# Patient Record
Sex: MALE | Race: WHITE | NOT HISPANIC OR LATINO | Employment: FULL TIME | URBAN - METROPOLITAN AREA
[De-identification: names, ages, dates, MRNs, and addresses within clinical notes are randomized per-mention and may not be internally consistent; named-entity substitution may affect disease eponyms.]

---

## 2017-02-01 ENCOUNTER — HISTORICAL (OUTPATIENT)
Dept: ADMINISTRATIVE | Facility: HOSPITAL | Age: 42
End: 2017-02-01

## 2017-05-15 ENCOUNTER — HISTORICAL (OUTPATIENT)
Dept: ADMINISTRATIVE | Facility: HOSPITAL | Age: 42
End: 2017-05-15

## 2017-05-17 ENCOUNTER — HISTORICAL (OUTPATIENT)
Dept: CARDIOLOGY | Facility: HOSPITAL | Age: 42
End: 2017-05-17

## 2017-05-24 ENCOUNTER — HISTORICAL (OUTPATIENT)
Dept: ADMINISTRATIVE | Facility: HOSPITAL | Age: 42
End: 2017-05-24

## 2017-06-20 ENCOUNTER — HISTORICAL (OUTPATIENT)
Dept: RADIOLOGY | Facility: HOSPITAL | Age: 42
End: 2017-06-20

## 2017-08-03 ENCOUNTER — HISTORICAL (OUTPATIENT)
Dept: SURGERY | Facility: HOSPITAL | Age: 42
End: 2017-08-03

## 2017-10-12 ENCOUNTER — HISTORICAL (OUTPATIENT)
Dept: ADMINISTRATIVE | Facility: HOSPITAL | Age: 42
End: 2017-10-12

## 2017-10-12 LAB
ABS NEUT (OLG): 2.85 X10(3)/MCL (ref 2.1–9.2)
ALBUMIN SERPL-MCNC: 3.8 GM/DL (ref 3.4–5)
ALBUMIN/GLOB SERPL: 1.4 {RATIO}
ALP SERPL-CCNC: 115 UNIT/L (ref 50–136)
ALT SERPL-CCNC: 26 UNIT/L (ref 12–78)
APPEARANCE, UA: CLEAR
AST SERPL-CCNC: 12 UNIT/L (ref 15–37)
BACTERIA SPEC CULT: NORMAL /HPF
BASOPHILS # BLD AUTO: 0 X10(3)/MCL (ref 0–0.2)
BASOPHILS NFR BLD AUTO: 1 %
BILIRUB SERPL-MCNC: 1.2 MG/DL (ref 0.2–1)
BILIRUB UR QL STRIP: NEGATIVE
BILIRUBIN DIRECT+TOT PNL SERPL-MCNC: 0.2 MG/DL (ref 0–0.2)
BILIRUBIN DIRECT+TOT PNL SERPL-MCNC: 1 MG/DL (ref 0–0.8)
BUN SERPL-MCNC: 14 MG/DL (ref 7–18)
CALCIUM SERPL-MCNC: 9.2 MG/DL (ref 8.5–10.1)
CHLORIDE SERPL-SCNC: 106 MMOL/L (ref 98–107)
CHOLEST SERPL-MCNC: 186 MG/DL (ref 0–200)
CHOLEST/HDLC SERPL: 4.8 {RATIO} (ref 0–5)
CO2 SERPL-SCNC: 25 MMOL/L (ref 21–32)
COLOR UR: YELLOW
CREAT SERPL-MCNC: 1 MG/DL (ref 0.7–1.3)
EOSINOPHIL # BLD AUTO: 0.2 X10(3)/MCL (ref 0–0.9)
EOSINOPHIL NFR BLD AUTO: 4 %
ERYTHROCYTE [DISTWIDTH] IN BLOOD BY AUTOMATED COUNT: 12.5 % (ref 11.5–17)
GLOBULIN SER-MCNC: 2.8 GM/DL (ref 2.4–3.5)
GLUCOSE (UA): NEGATIVE
GLUCOSE SERPL-MCNC: 91 MG/DL (ref 74–106)
HCT VFR BLD AUTO: 45.6 % (ref 42–52)
HDLC SERPL-MCNC: 39 MG/DL (ref 35–60)
HGB BLD-MCNC: 16.2 GM/DL (ref 14–18)
HGB UR QL STRIP: NEGATIVE
KETONES UR QL STRIP: NEGATIVE
LDLC SERPL CALC-MCNC: 104 MG/DL (ref 0–129)
LEUKOCYTE ESTERASE UR QL STRIP: NEGATIVE
LYMPHOCYTES # BLD AUTO: 1.4 X10(3)/MCL (ref 0.6–4.6)
LYMPHOCYTES NFR BLD AUTO: 28 %
MCH RBC QN AUTO: 32 PG (ref 27–31)
MCHC RBC AUTO-ENTMCNC: 35.5 GM/DL (ref 33–36)
MCV RBC AUTO: 89.9 FL (ref 80–94)
MONOCYTES # BLD AUTO: 0.6 X10(3)/MCL (ref 0.1–1.3)
MONOCYTES NFR BLD AUTO: 11 %
NEUTROPHILS # BLD AUTO: 2.85 X10(3)/MCL (ref 2.1–9.2)
NEUTROPHILS NFR BLD AUTO: 56 %
NITRITE UR QL STRIP: NEGATIVE
PH UR STRIP: 6 [PH] (ref 5–9)
PLATELET # BLD AUTO: 168 X10(3)/MCL (ref 130–400)
PMV BLD AUTO: 9 FL (ref 9.4–12.4)
POTASSIUM SERPL-SCNC: 4.2 MMOL/L (ref 3.5–5.1)
PROT SERPL-MCNC: 6.6 GM/DL (ref 6.4–8.2)
PROT UR QL STRIP: NEGATIVE
PSA SERPL-MCNC: 0.54 NG/ML (ref 0–4)
RBC # BLD AUTO: 5.07 X10(6)/MCL (ref 4.7–6.1)
RBC #/AREA URNS HPF: NORMAL /[HPF]
SODIUM SERPL-SCNC: 141 MMOL/L (ref 136–145)
SP GR UR STRIP: 1.02 (ref 1–1.03)
SQUAMOUS EPITHELIAL, UA: NORMAL
TRIGL SERPL-MCNC: 216 MG/DL (ref 30–150)
TSH SERPL-ACNC: 2.33 MIU/ML (ref 0.36–3.74)
UROBILINOGEN UR STRIP-ACNC: 0.2
VLDLC SERPL CALC-MCNC: 43 MG/DL
WBC # SPEC AUTO: 5 X10(3)/MCL (ref 4.5–11.5)
WBC #/AREA URNS HPF: NORMAL /HPF

## 2018-09-17 ENCOUNTER — HISTORICAL (OUTPATIENT)
Dept: ADMINISTRATIVE | Facility: HOSPITAL | Age: 43
End: 2018-09-17

## 2019-01-15 ENCOUNTER — HISTORICAL (OUTPATIENT)
Dept: ADMINISTRATIVE | Facility: HOSPITAL | Age: 44
End: 2019-01-15

## 2019-10-31 ENCOUNTER — HISTORICAL (OUTPATIENT)
Dept: ADMINISTRATIVE | Facility: HOSPITAL | Age: 44
End: 2019-10-31

## 2019-10-31 LAB
ABS NEUT (OLG): 2.81 X10(3)/MCL (ref 2.1–9.2)
ALBUMIN SERPL-MCNC: 3.7 GM/DL (ref 3.4–5)
ALBUMIN/GLOB SERPL: 1.5 RATIO (ref 1.1–2)
ALP SERPL-CCNC: 69 UNIT/L (ref 50–136)
ALT SERPL-CCNC: 40 UNIT/L (ref 12–78)
APPEARANCE, UA: CLEAR
AST SERPL-CCNC: 24 UNIT/L (ref 15–37)
BACTERIA SPEC CULT: NORMAL /HPF
BASOPHILS # BLD AUTO: 0 X10(3)/MCL (ref 0–0.2)
BASOPHILS NFR BLD AUTO: 1 %
BILIRUB SERPL-MCNC: 1.8 MG/DL (ref 0.2–1)
BILIRUB UR QL STRIP: NEGATIVE
BILIRUBIN DIRECT+TOT PNL SERPL-MCNC: 0.4 MG/DL (ref 0–0.5)
BILIRUBIN DIRECT+TOT PNL SERPL-MCNC: 1.4 MG/DL (ref 0–0.8)
BUN SERPL-MCNC: 12 MG/DL (ref 7–18)
CALCIUM SERPL-MCNC: 9.3 MG/DL (ref 8.5–10.1)
CHLORIDE SERPL-SCNC: 108 MMOL/L (ref 98–107)
CHOLEST SERPL-MCNC: 116 MG/DL (ref 0–200)
CHOLEST/HDLC SERPL: 2.4 {RATIO} (ref 0–5)
CO2 SERPL-SCNC: 33 MMOL/L (ref 21–32)
COLOR UR: YELLOW
CREAT SERPL-MCNC: 0.97 MG/DL (ref 0.7–1.3)
EOSINOPHIL # BLD AUTO: 0.2 X10(3)/MCL (ref 0–0.9)
EOSINOPHIL NFR BLD AUTO: 3 %
ERYTHROCYTE [DISTWIDTH] IN BLOOD BY AUTOMATED COUNT: 12.6 % (ref 11.5–17)
GLOBULIN SER-MCNC: 2.5 GM/DL (ref 2.4–3.5)
GLUCOSE (UA): NEGATIVE
GLUCOSE SERPL-MCNC: 95 MG/DL (ref 74–106)
HCT VFR BLD AUTO: 44.7 % (ref 42–52)
HDLC SERPL-MCNC: 49 MG/DL (ref 35–60)
HGB BLD-MCNC: 15.1 GM/DL (ref 14–18)
HGB UR QL STRIP: NEGATIVE
KETONES UR QL STRIP: NEGATIVE
LDLC SERPL CALC-MCNC: 52 MG/DL (ref 0–129)
LEUKOCYTE ESTERASE UR QL STRIP: NEGATIVE
LYMPHOCYTES # BLD AUTO: 1 X10(3)/MCL (ref 0.6–4.6)
LYMPHOCYTES NFR BLD AUTO: 23 %
MCH RBC QN AUTO: 31.4 PG (ref 27–31)
MCHC RBC AUTO-ENTMCNC: 33.8 GM/DL (ref 33–36)
MCV RBC AUTO: 92.9 FL (ref 80–94)
MONOCYTES # BLD AUTO: 0.4 X10(3)/MCL (ref 0.1–1.3)
MONOCYTES NFR BLD AUTO: 9 %
NEUTROPHILS # BLD AUTO: 2.81 X10(3)/MCL (ref 2.1–9.2)
NEUTROPHILS NFR BLD AUTO: 63 %
NITRITE UR QL STRIP: NEGATIVE
PH UR STRIP: 6.5 [PH] (ref 5–9)
PLATELET # BLD AUTO: 196 X10(3)/MCL (ref 130–400)
PMV BLD AUTO: 9.2 FL (ref 9.4–12.4)
POTASSIUM SERPL-SCNC: 4.1 MMOL/L (ref 3.5–5.1)
PROT SERPL-MCNC: 6.2 GM/DL (ref 6.4–8.2)
PROT UR QL STRIP: NEGATIVE
PSA SERPL-MCNC: 0.32 NG/ML (ref 0–4)
RBC # BLD AUTO: 4.81 X10(6)/MCL (ref 4.7–6.1)
RBC #/AREA URNS HPF: NORMAL /[HPF]
SODIUM SERPL-SCNC: 143 MMOL/L (ref 136–145)
SP GR UR STRIP: 1.01 (ref 1–1.03)
SQUAMOUS EPITHELIAL, UA: NORMAL
T4 FREE SERPL-MCNC: 0.91 NG/DL (ref 0.76–1.46)
TRIGL SERPL-MCNC: 75 MG/DL (ref 30–150)
TSH SERPL-ACNC: 0.8 MIU/L (ref 0.36–3.74)
UROBILINOGEN UR STRIP-ACNC: 0.2
VLDLC SERPL CALC-MCNC: 15 MG/DL
WBC # SPEC AUTO: 4.4 X10(3)/MCL (ref 4.5–11.5)
WBC #/AREA URNS HPF: NORMAL /HPF

## 2019-11-19 ENCOUNTER — HISTORICAL (OUTPATIENT)
Dept: LAB | Facility: HOSPITAL | Age: 44
End: 2019-11-19

## 2019-11-19 LAB
ALBUMIN SERPL-MCNC: 4.2 GM/DL (ref 3.4–5)
ALBUMIN/GLOB SERPL: 1.6 RATIO (ref 1.1–2)
ALP SERPL-CCNC: 71 UNIT/L (ref 50–136)
ALT SERPL-CCNC: 34 UNIT/L (ref 12–78)
AMYLASE SERPL-CCNC: 35 UNIT/L (ref 25–115)
AST SERPL-CCNC: 17 UNIT/L (ref 15–37)
BILIRUB SERPL-MCNC: 2.6 MG/DL (ref 0.2–1)
BILIRUBIN DIRECT+TOT PNL SERPL-MCNC: 0.3 MG/DL (ref 0–0.5)
BILIRUBIN DIRECT+TOT PNL SERPL-MCNC: 2.3 MG/DL (ref 0–0.8)
BUN SERPL-MCNC: 12 MG/DL (ref 7–18)
CALCIUM SERPL-MCNC: 9.4 MG/DL (ref 8.5–10.1)
CHLORIDE SERPL-SCNC: 104 MMOL/L (ref 98–107)
CO2 SERPL-SCNC: 30 MMOL/L (ref 21–32)
CREAT SERPL-MCNC: 1.1 MG/DL (ref 0.7–1.3)
GLOBULIN SER-MCNC: 2.6 GM/DL (ref 2.4–3.5)
GLUCOSE SERPL-MCNC: 88 MG/DL (ref 74–106)
HBV SURFACE AG SERPL QL IA: NEGATIVE
HCV AB SERPL QL IA: NEGATIVE
LIPASE SERPL-CCNC: 143 UNIT/L (ref 73–393)
POTASSIUM SERPL-SCNC: 4.3 MMOL/L (ref 3.5–5.1)
PROT SERPL-MCNC: 6.8 GM/DL (ref 6.4–8.2)
SODIUM SERPL-SCNC: 140 MMOL/L (ref 136–145)

## 2020-03-23 ENCOUNTER — HISTORICAL (OUTPATIENT)
Dept: ADMINISTRATIVE | Facility: HOSPITAL | Age: 45
End: 2020-03-23

## 2020-03-23 LAB
ALBUMIN SERPL-MCNC: 3.8 GM/DL (ref 3.4–5)
ALBUMIN/GLOB SERPL: 1.3 RATIO (ref 1.1–2)
ALP SERPL-CCNC: 71 UNIT/L (ref 50–136)
ALT SERPL-CCNC: 31 UNIT/L (ref 12–78)
AST SERPL-CCNC: 18 UNIT/L (ref 15–37)
BILIRUB SERPL-MCNC: 2.9 MG/DL (ref 0.2–1)
BILIRUBIN DIRECT+TOT PNL SERPL-MCNC: 0.3 MG/DL (ref 0–0.5)
BILIRUBIN DIRECT+TOT PNL SERPL-MCNC: 2.6 MG/DL (ref 0–0.8)
BUN SERPL-MCNC: 17 MG/DL (ref 7–18)
CALCIUM SERPL-MCNC: 9.8 MG/DL (ref 8.5–10.1)
CHLORIDE SERPL-SCNC: 105 MMOL/L (ref 98–107)
CO2 SERPL-SCNC: 34 MMOL/L (ref 21–32)
CREAT SERPL-MCNC: 0.98 MG/DL (ref 0.7–1.3)
GLOBULIN SER-MCNC: 3 GM/DL (ref 2.4–3.5)
GLUCOSE SERPL-MCNC: 97 MG/DL (ref 74–106)
POTASSIUM SERPL-SCNC: 4 MMOL/L (ref 3.5–5.1)
PROT SERPL-MCNC: 6.8 GM/DL (ref 6.4–8.2)
SODIUM SERPL-SCNC: 143 MMOL/L (ref 136–145)

## 2020-09-15 ENCOUNTER — HISTORICAL (OUTPATIENT)
Dept: ADMINISTRATIVE | Facility: HOSPITAL | Age: 45
End: 2020-09-15

## 2020-09-15 LAB — POC CREATININE: 0.8 MG/DL (ref 0.6–1.3)

## 2020-09-23 ENCOUNTER — HISTORICAL (OUTPATIENT)
Dept: ADMINISTRATIVE | Facility: HOSPITAL | Age: 45
End: 2020-09-23

## 2020-09-23 LAB
ALBUMIN SERPL-MCNC: 4.1 GM/DL (ref 3.5–5)
ALBUMIN/GLOB SERPL: 1.7 RATIO (ref 1.1–2)
ALP SERPL-CCNC: 70 UNIT/L (ref 40–150)
ALT SERPL-CCNC: 31 UNIT/L (ref 0–55)
AST SERPL-CCNC: 23 UNIT/L (ref 5–34)
BILIRUB SERPL-MCNC: 2.6 MG/DL
BILIRUBIN DIRECT+TOT PNL SERPL-MCNC: 0.6 MG/DL (ref 0–0.5)
BILIRUBIN DIRECT+TOT PNL SERPL-MCNC: 2 MG/DL (ref 0–0.8)
BUN SERPL-MCNC: 19 MG/DL (ref 8.9–20.6)
CALCIUM SERPL-MCNC: 9.3 MG/DL (ref 8.4–10.2)
CHLORIDE SERPL-SCNC: 102 MMOL/L (ref 98–107)
CO2 SERPL-SCNC: 32 MMOL/L (ref 22–29)
CREAT SERPL-MCNC: 1.03 MG/DL (ref 0.73–1.18)
GLOBULIN SER-MCNC: 2.4 GM/DL (ref 2.4–3.5)
GLUCOSE SERPL-MCNC: 90 MG/DL (ref 74–100)
POTASSIUM SERPL-SCNC: 4.4 MMOL/L (ref 3.5–5.1)
PROT SERPL-MCNC: 6.5 GM/DL (ref 6.4–8.3)
SODIUM SERPL-SCNC: 142 MMOL/L (ref 136–145)

## 2020-11-03 ENCOUNTER — HISTORICAL (OUTPATIENT)
Dept: ADMINISTRATIVE | Facility: HOSPITAL | Age: 45
End: 2020-11-03

## 2020-11-03 LAB
ABS NEUT (OLG): 4.36 X10(3)/MCL (ref 2.1–9.2)
ALBUMIN SERPL-MCNC: 4.1 GM/DL (ref 3.5–5)
ALBUMIN/GLOB SERPL: 1.9 RATIO (ref 1.1–2)
ALP SERPL-CCNC: 69 UNIT/L (ref 40–150)
ALT SERPL-CCNC: 33 UNIT/L (ref 0–55)
APPEARANCE, UA: CLEAR
AST SERPL-CCNC: 24 UNIT/L (ref 5–34)
BACTERIA SPEC CULT: NORMAL /HPF
BASOPHILS # BLD AUTO: 0 X10(3)/MCL (ref 0–0.2)
BASOPHILS NFR BLD AUTO: 1 %
BILIRUB SERPL-MCNC: 3.2 MG/DL
BILIRUB UR QL STRIP: NEGATIVE
BILIRUBIN DIRECT+TOT PNL SERPL-MCNC: 0.7 MG/DL (ref 0–0.5)
BILIRUBIN DIRECT+TOT PNL SERPL-MCNC: 2.5 MG/DL (ref 0–0.8)
BUN SERPL-MCNC: 13.8 MG/DL (ref 8.9–20.6)
CALCIUM SERPL-MCNC: 9.2 MG/DL (ref 8.4–10.2)
CHLORIDE SERPL-SCNC: 102 MMOL/L (ref 98–107)
CHOLEST SERPL-MCNC: 145 MG/DL
CHOLEST/HDLC SERPL: 3 {RATIO} (ref 0–5)
CO2 SERPL-SCNC: 31 MMOL/L (ref 22–29)
COLOR UR: YELLOW
CREAT SERPL-MCNC: 0.93 MG/DL (ref 0.73–1.18)
EOSINOPHIL # BLD AUTO: 0.2 X10(3)/MCL (ref 0–0.9)
EOSINOPHIL NFR BLD AUTO: 3 %
ERYTHROCYTE [DISTWIDTH] IN BLOOD BY AUTOMATED COUNT: 12.3 % (ref 11.5–17)
GLOBULIN SER-MCNC: 2.2 GM/DL (ref 2.4–3.5)
GLUCOSE (UA): NEGATIVE
GLUCOSE SERPL-MCNC: 82 MG/DL (ref 74–100)
HCT VFR BLD AUTO: 46.4 % (ref 42–52)
HDLC SERPL-MCNC: 52 MG/DL (ref 35–60)
HGB BLD-MCNC: 16 GM/DL (ref 14–18)
HGB UR QL STRIP: NEGATIVE
KETONES UR QL STRIP: NEGATIVE
LDLC SERPL CALC-MCNC: 67 MG/DL (ref 50–140)
LEUKOCYTE ESTERASE UR QL STRIP: NEGATIVE
LYMPHOCYTES # BLD AUTO: 1.2 X10(3)/MCL (ref 0.6–4.6)
LYMPHOCYTES NFR BLD AUTO: 18 %
MCH RBC QN AUTO: 31.9 PG (ref 27–31)
MCHC RBC AUTO-ENTMCNC: 34.5 GM/DL (ref 33–36)
MCV RBC AUTO: 92.4 FL (ref 80–94)
MONOCYTES # BLD AUTO: 0.5 X10(3)/MCL (ref 0.1–1.3)
MONOCYTES NFR BLD AUTO: 8 %
NEUTROPHILS # BLD AUTO: 4.36 X10(3)/MCL (ref 2.1–9.2)
NEUTROPHILS NFR BLD AUTO: 70 %
NITRITE UR QL STRIP: NEGATIVE
PH UR STRIP: 7 [PH] (ref 5–9)
PLATELET # BLD AUTO: 168 X10(3)/MCL (ref 130–400)
PMV BLD AUTO: 9.5 FL (ref 9.4–12.4)
POTASSIUM SERPL-SCNC: 4.5 MMOL/L (ref 3.5–5.1)
PROT SERPL-MCNC: 6.3 GM/DL (ref 6.4–8.3)
PROT UR QL STRIP: NEGATIVE
PSA SERPL-MCNC: 0.33 NG/ML
RBC # BLD AUTO: 5.02 X10(6)/MCL (ref 4.7–6.1)
RBC #/AREA URNS HPF: NORMAL /[HPF]
SODIUM SERPL-SCNC: 143 MMOL/L (ref 136–145)
SP GR UR STRIP: 1.01 (ref 1–1.03)
SQUAMOUS EPITHELIAL, UA: NORMAL
T4 FREE SERPL-MCNC: 0.99 NG/DL (ref 0.7–1.48)
TRIGL SERPL-MCNC: 129 MG/DL (ref 34–140)
TSH SERPL-ACNC: 1.11 UIU/ML (ref 0.35–4.94)
UROBILINOGEN UR STRIP-ACNC: 1
VLDLC SERPL CALC-MCNC: 26 MG/DL
WBC # SPEC AUTO: 6.3 X10(3)/MCL (ref 4.5–11.5)
WBC #/AREA URNS HPF: NORMAL /HPF

## 2021-03-24 ENCOUNTER — HISTORICAL (OUTPATIENT)
Dept: ADMINISTRATIVE | Facility: HOSPITAL | Age: 46
End: 2021-03-24

## 2021-03-24 LAB
ALBUMIN SERPL-MCNC: 4.3 GM/DL (ref 3.5–5)
ALBUMIN/GLOB SERPL: 1.9 RATIO (ref 1.1–2)
ALP SERPL-CCNC: 83 UNIT/L (ref 40–150)
ALT SERPL-CCNC: 33 UNIT/L (ref 0–55)
AST SERPL-CCNC: 23 UNIT/L (ref 5–34)
BILIRUB SERPL-MCNC: 2.5 MG/DL
BILIRUBIN DIRECT+TOT PNL SERPL-MCNC: 0.6 MG/DL (ref 0–0.5)
BILIRUBIN DIRECT+TOT PNL SERPL-MCNC: 1.9 MG/DL (ref 0–0.8)
BUN SERPL-MCNC: 12.9 MG/DL (ref 8.9–20.6)
CALCIUM SERPL-MCNC: 9 MG/DL (ref 8.4–10.2)
CHLORIDE SERPL-SCNC: 103 MMOL/L (ref 98–107)
CO2 SERPL-SCNC: 29 MMOL/L (ref 22–29)
CREAT SERPL-MCNC: 0.99 MG/DL (ref 0.73–1.18)
GLOBULIN SER-MCNC: 2.3 GM/DL (ref 2.4–3.5)
GLUCOSE SERPL-MCNC: 41 MG/DL (ref 74–100)
POTASSIUM SERPL-SCNC: 3.9 MMOL/L (ref 3.5–5.1)
PROT SERPL-MCNC: 6.6 GM/DL (ref 6.4–8.3)
SODIUM SERPL-SCNC: 146 MMOL/L (ref 136–145)

## 2021-11-09 ENCOUNTER — HISTORICAL (OUTPATIENT)
Dept: ADMINISTRATIVE | Facility: HOSPITAL | Age: 46
End: 2021-11-09

## 2021-11-09 LAB
ABS NEUT (OLG): 2.8 X10(3)/MCL (ref 2.1–9.2)
ALBUMIN SERPL-MCNC: 4.3 GM/DL (ref 3.5–5)
ALBUMIN/GLOB SERPL: 1.7 RATIO (ref 1.1–2)
ALP SERPL-CCNC: 73 UNIT/L (ref 40–150)
ALT SERPL-CCNC: 32 UNIT/L (ref 0–55)
APPEARANCE, UA: CLEAR
AST SERPL-CCNC: 24 UNIT/L (ref 5–34)
BACTERIA SPEC CULT: NORMAL /HPF
BASOPHILS # BLD AUTO: 0 X10(3)/MCL (ref 0–0.2)
BASOPHILS NFR BLD AUTO: 1 %
BILIRUB SERPL-MCNC: 3.9 MG/DL
BILIRUB UR QL STRIP: NEGATIVE
BILIRUBIN DIRECT+TOT PNL SERPL-MCNC: 0.3 MG/DL (ref 0–0.5)
BILIRUBIN DIRECT+TOT PNL SERPL-MCNC: 3.6 MG/DL (ref 0–0.8)
BUN SERPL-MCNC: 12.4 MG/DL (ref 8.9–20.6)
CALCIUM SERPL-MCNC: 10.2 MG/DL (ref 8.7–10.5)
CHLORIDE SERPL-SCNC: 102 MMOL/L (ref 98–107)
CHOLEST SERPL-MCNC: 118 MG/DL
CHOLEST/HDLC SERPL: 3 {RATIO} (ref 0–5)
CO2 SERPL-SCNC: 34 MMOL/L (ref 22–29)
COLOR UR: YELLOW
CREAT SERPL-MCNC: 0.84 MG/DL (ref 0.73–1.18)
EOSINOPHIL # BLD AUTO: 0.2 X10(3)/MCL (ref 0–0.9)
EOSINOPHIL NFR BLD AUTO: 4 %
ERYTHROCYTE [DISTWIDTH] IN BLOOD BY AUTOMATED COUNT: 12.5 % (ref 11.5–17)
GLOBULIN SER-MCNC: 2.5 GM/DL (ref 2.4–3.5)
GLUCOSE (UA): NEGATIVE
GLUCOSE SERPL-MCNC: 77 MG/DL (ref 74–100)
HCT VFR BLD AUTO: 45.7 % (ref 42–52)
HDLC SERPL-MCNC: 47 MG/DL (ref 35–60)
HGB BLD-MCNC: 15.8 GM/DL (ref 14–18)
HGB UR QL STRIP: NEGATIVE
KETONES UR QL STRIP: NEGATIVE
LDLC SERPL CALC-MCNC: 57 MG/DL (ref 50–140)
LEUKOCYTE ESTERASE UR QL STRIP: NEGATIVE
LYMPHOCYTES # BLD AUTO: 1.4 X10(3)/MCL (ref 0.6–4.6)
LYMPHOCYTES NFR BLD AUTO: 28 %
MCH RBC QN AUTO: 31.7 PG (ref 27–31)
MCHC RBC AUTO-ENTMCNC: 34.6 GM/DL (ref 33–36)
MCV RBC AUTO: 91.6 FL (ref 80–94)
MONOCYTES # BLD AUTO: 0.4 X10(3)/MCL (ref 0.1–1.3)
MONOCYTES NFR BLD AUTO: 9 %
NEUTROPHILS # BLD AUTO: 2.8 X10(3)/MCL (ref 2.1–9.2)
NEUTROPHILS NFR BLD AUTO: 58 %
NITRITE UR QL STRIP: NEGATIVE
PH UR STRIP: 7 [PH] (ref 5–9)
PLATELET # BLD AUTO: 186 X10(3)/MCL (ref 130–400)
PMV BLD AUTO: 10 FL (ref 9.4–12.4)
POTASSIUM SERPL-SCNC: 4.2 MMOL/L (ref 3.5–5.1)
PROT SERPL-MCNC: 6.8 GM/DL (ref 6.4–8.3)
PROT UR QL STRIP: NEGATIVE
PSA SERPL-MCNC: 0.39 NG/ML
RBC # BLD AUTO: 4.99 X10(6)/MCL (ref 4.7–6.1)
RBC #/AREA URNS HPF: NORMAL /[HPF]
SODIUM SERPL-SCNC: 143 MMOL/L (ref 136–145)
SP GR UR STRIP: 1 (ref 1–1.03)
SQUAMOUS EPITHELIAL, UA: NORMAL /HPF (ref 0–4)
T4 FREE SERPL-MCNC: 0.89 NG/DL (ref 0.7–1.48)
TRIGL SERPL-MCNC: 70 MG/DL (ref 34–140)
TSH SERPL-ACNC: 1.25 UIU/ML (ref 0.35–4.94)
UROBILINOGEN UR STRIP-ACNC: 0.2
VLDLC SERPL CALC-MCNC: 14 MG/DL
WBC # SPEC AUTO: 4.8 X10(3)/MCL (ref 4.5–11.5)
WBC #/AREA URNS HPF: NORMAL /HPF

## 2022-11-14 DIAGNOSIS — Z00.00 WELL ADULT EXAM: Primary | ICD-10-CM

## 2022-11-14 DIAGNOSIS — Z12.5 PROSTATE CANCER SCREENING: ICD-10-CM

## 2022-11-18 ENCOUNTER — LAB VISIT (OUTPATIENT)
Dept: LAB | Facility: HOSPITAL | Age: 47
End: 2022-11-18
Attending: INTERNAL MEDICINE
Payer: COMMERCIAL

## 2022-11-18 DIAGNOSIS — Z00.00 WELL ADULT EXAM: ICD-10-CM

## 2022-11-18 DIAGNOSIS — Z12.5 PROSTATE CANCER SCREENING: ICD-10-CM

## 2022-11-18 LAB
ALBUMIN SERPL-MCNC: 4.3 GM/DL (ref 3.5–5)
ALBUMIN/GLOB SERPL: 1.7 RATIO (ref 1.1–2)
ALP SERPL-CCNC: 68 UNIT/L (ref 40–150)
ALT SERPL-CCNC: 38 UNIT/L (ref 0–55)
APPEARANCE UR: CLEAR
AST SERPL-CCNC: 28 UNIT/L (ref 5–34)
BACTERIA #/AREA URNS AUTO: NORMAL /HPF
BASOPHILS # BLD AUTO: 0.03 X10(3)/MCL (ref 0–0.2)
BASOPHILS NFR BLD AUTO: 0.7 %
BILIRUB UR QL STRIP.AUTO: NEGATIVE MG/DL
BILIRUBIN DIRECT+TOT PNL SERPL-MCNC: 3.5 MG/DL
BUN SERPL-MCNC: 18.2 MG/DL (ref 8.9–20.6)
CALCIUM SERPL-MCNC: 9.8 MG/DL (ref 8.4–10.2)
CHLORIDE SERPL-SCNC: 103 MMOL/L (ref 98–107)
CHOLEST SERPL-MCNC: 125 MG/DL
CHOLEST/HDLC SERPL: 3 {RATIO} (ref 0–5)
CO2 SERPL-SCNC: 32 MMOL/L (ref 22–29)
COLOR UR AUTO: YELLOW
CREAT SERPL-MCNC: 1.05 MG/DL (ref 0.73–1.18)
EOSINOPHIL # BLD AUTO: 0.12 X10(3)/MCL (ref 0–0.9)
EOSINOPHIL NFR BLD AUTO: 2.8 %
ERYTHROCYTE [DISTWIDTH] IN BLOOD BY AUTOMATED COUNT: 12.6 % (ref 11.5–17)
GFR SERPLBLD CREATININE-BSD FMLA CKD-EPI: >60 MLS/MIN/1.73/M2
GLOBULIN SER-MCNC: 2.5 GM/DL (ref 2.4–3.5)
GLUCOSE SERPL-MCNC: 89 MG/DL (ref 74–100)
GLUCOSE UR QL STRIP.AUTO: NEGATIVE MG/DL
HCT VFR BLD AUTO: 47.7 % (ref 42–52)
HDLC SERPL-MCNC: 47 MG/DL (ref 35–60)
HGB BLD-MCNC: 16.6 GM/DL (ref 14–18)
IMM GRANULOCYTES # BLD AUTO: 0.01 X10(3)/MCL (ref 0–0.04)
IMM GRANULOCYTES NFR BLD AUTO: 0.2 %
KETONES UR QL STRIP.AUTO: NEGATIVE MG/DL
LDLC SERPL CALC-MCNC: 68 MG/DL (ref 50–140)
LEUKOCYTE ESTERASE UR QL STRIP.AUTO: NEGATIVE UNIT/L
LYMPHOCYTES # BLD AUTO: 1 X10(3)/MCL (ref 0.6–4.6)
LYMPHOCYTES NFR BLD AUTO: 23.6 %
MCH RBC QN AUTO: 32 PG (ref 27–31)
MCHC RBC AUTO-ENTMCNC: 34.8 MG/DL (ref 33–36)
MCV RBC AUTO: 91.9 FL (ref 80–94)
MONOCYTES # BLD AUTO: 0.35 X10(3)/MCL (ref 0.1–1.3)
MONOCYTES NFR BLD AUTO: 8.3 %
NEUTROPHILS # BLD AUTO: 2.7 X10(3)/MCL (ref 2.1–9.2)
NEUTROPHILS NFR BLD AUTO: 64.4 %
NITRITE UR QL STRIP.AUTO: NEGATIVE
NRBC BLD AUTO-RTO: 0 %
PH UR STRIP.AUTO: 7 [PH]
PLATELET # BLD AUTO: 180 X10(3)/MCL (ref 130–400)
PMV BLD AUTO: 9.6 FL (ref 7.4–10.4)
POTASSIUM SERPL-SCNC: 4.4 MMOL/L (ref 3.5–5.1)
PROT SERPL-MCNC: 6.8 GM/DL (ref 6.4–8.3)
PROT UR QL STRIP.AUTO: NEGATIVE MG/DL
PSA SERPL-MCNC: 0.37 NG/ML
RBC # BLD AUTO: 5.19 X10(6)/MCL (ref 4.7–6.1)
RBC #/AREA URNS AUTO: <5 /HPF
RBC UR QL AUTO: NEGATIVE UNIT/L
SODIUM SERPL-SCNC: 142 MMOL/L (ref 136–145)
SP GR UR STRIP.AUTO: 1.02 (ref 1–1.03)
SQUAMOUS #/AREA URNS AUTO: <5 /HPF
T4 FREE SERPL-MCNC: 0.91 NG/DL (ref 0.7–1.48)
TRIGL SERPL-MCNC: 52 MG/DL (ref 34–140)
TSH SERPL-ACNC: 1.55 UIU/ML (ref 0.35–4.94)
UROBILINOGEN UR STRIP-ACNC: 1 MG/DL
VLDLC SERPL CALC-MCNC: 10 MG/DL
WBC # SPEC AUTO: 4.2 X10(3)/MCL (ref 4.5–11.5)
WBC #/AREA URNS AUTO: <5 /HPF

## 2022-11-18 PROCEDURE — 80061 LIPID PANEL: CPT

## 2022-11-18 PROCEDURE — 80053 COMPREHEN METABOLIC PANEL: CPT

## 2022-11-18 PROCEDURE — 84439 ASSAY OF FREE THYROXINE: CPT

## 2022-11-18 PROCEDURE — 84153 ASSAY OF PSA TOTAL: CPT

## 2022-11-18 PROCEDURE — 81001 URINALYSIS AUTO W/SCOPE: CPT

## 2022-11-18 PROCEDURE — 84443 ASSAY THYROID STIM HORMONE: CPT

## 2022-11-18 PROCEDURE — 85025 COMPLETE CBC W/AUTO DIFF WBC: CPT

## 2022-11-18 PROCEDURE — 36415 COLL VENOUS BLD VENIPUNCTURE: CPT

## 2022-11-21 ENCOUNTER — OFFICE VISIT (OUTPATIENT)
Dept: INTERNAL MEDICINE | Facility: CLINIC | Age: 47
End: 2022-11-21
Payer: COMMERCIAL

## 2022-11-21 VITALS
RESPIRATION RATE: 16 BRPM | OXYGEN SATURATION: 96 % | HEIGHT: 69 IN | SYSTOLIC BLOOD PRESSURE: 118 MMHG | TEMPERATURE: 98 F | HEART RATE: 66 BPM | DIASTOLIC BLOOD PRESSURE: 72 MMHG | WEIGHT: 171 LBS | BODY MASS INDEX: 25.33 KG/M2

## 2022-11-21 DIAGNOSIS — I25.10 CORONARY ARTERY DISEASE INVOLVING NATIVE CORONARY ARTERY OF NATIVE HEART WITHOUT ANGINA PECTORIS: ICD-10-CM

## 2022-11-21 DIAGNOSIS — Z12.5 PROSTATE CANCER SCREENING: ICD-10-CM

## 2022-11-21 DIAGNOSIS — E78.5 DYSLIPIDEMIA: ICD-10-CM

## 2022-11-21 DIAGNOSIS — Z00.00 WELL ADULT EXAM: Primary | ICD-10-CM

## 2022-11-21 DIAGNOSIS — I10 PRIMARY HYPERTENSION: ICD-10-CM

## 2022-11-21 DIAGNOSIS — E03.8 OTHER SPECIFIED HYPOTHYROIDISM: ICD-10-CM

## 2022-11-21 PROBLEM — I42.9 SECONDARY CARDIOMYOPATHY: Status: ACTIVE | Noted: 2022-11-21

## 2022-11-21 PROCEDURE — 99396 PREV VISIT EST AGE 40-64: CPT | Mod: ,,, | Performed by: INTERNAL MEDICINE

## 2022-11-21 PROCEDURE — 3074F SYST BP LT 130 MM HG: CPT | Mod: CPTII,,, | Performed by: INTERNAL MEDICINE

## 2022-11-21 PROCEDURE — 99396 PR PREVENTIVE VISIT,EST,40-64: ICD-10-PCS | Mod: ,,, | Performed by: INTERNAL MEDICINE

## 2022-11-21 PROCEDURE — 3008F BODY MASS INDEX DOCD: CPT | Mod: CPTII,,, | Performed by: INTERNAL MEDICINE

## 2022-11-21 PROCEDURE — 1160F PR REVIEW ALL MEDS BY PRESCRIBER/CLIN PHARMACIST DOCUMENTED: ICD-10-PCS | Mod: CPTII,,, | Performed by: INTERNAL MEDICINE

## 2022-11-21 PROCEDURE — 3008F PR BODY MASS INDEX (BMI) DOCUMENTED: ICD-10-PCS | Mod: CPTII,,, | Performed by: INTERNAL MEDICINE

## 2022-11-21 PROCEDURE — 3078F DIAST BP <80 MM HG: CPT | Mod: CPTII,,, | Performed by: INTERNAL MEDICINE

## 2022-11-21 PROCEDURE — 1159F PR MEDICATION LIST DOCUMENTED IN MEDICAL RECORD: ICD-10-PCS | Mod: CPTII,,, | Performed by: INTERNAL MEDICINE

## 2022-11-21 PROCEDURE — 3078F PR MOST RECENT DIASTOLIC BLOOD PRESSURE < 80 MM HG: ICD-10-PCS | Mod: CPTII,,, | Performed by: INTERNAL MEDICINE

## 2022-11-21 PROCEDURE — 1159F MED LIST DOCD IN RCRD: CPT | Mod: CPTII,,, | Performed by: INTERNAL MEDICINE

## 2022-11-21 PROCEDURE — 1160F RVW MEDS BY RX/DR IN RCRD: CPT | Mod: CPTII,,, | Performed by: INTERNAL MEDICINE

## 2022-11-21 PROCEDURE — 3074F PR MOST RECENT SYSTOLIC BLOOD PRESSURE < 130 MM HG: ICD-10-PCS | Mod: CPTII,,, | Performed by: INTERNAL MEDICINE

## 2022-11-21 RX ORDER — LEVOTHYROXINE SODIUM 88 UG/1
88 TABLET ORAL DAILY
COMMUNITY
Start: 2022-10-31 | End: 2022-12-06

## 2022-11-21 RX ORDER — PANTOPRAZOLE SODIUM 40 MG/1
40 TABLET, DELAYED RELEASE ORAL DAILY
COMMUNITY
Start: 2022-10-31 | End: 2022-12-06

## 2022-11-21 RX ORDER — ATORVASTATIN CALCIUM 20 MG/1
20 TABLET, FILM COATED ORAL DAILY
COMMUNITY
Start: 2022-10-31

## 2022-11-21 RX ORDER — ASPIRIN 81 MG/1
81 TABLET ORAL DAILY
Qty: 90 TABLET | Refills: 3
Start: 2022-11-21 | End: 2023-11-28

## 2022-11-21 RX ORDER — CARVEDILOL 25 MG/1
25 TABLET ORAL 2 TIMES DAILY
COMMUNITY
Start: 2022-10-31 | End: 2023-04-25

## 2022-11-21 NOTE — PROGRESS NOTES
"Subjective:       Patient ID: Rei Long is a 47 y.o. male.    Chief Complaint: Annual Exam    47-year-old white male is here for follow-up and wellness exam. All conditions have been stable since last visit     Review of Systems   Constitutional:  Negative for fever.   HENT:  Negative for nosebleeds.    Eyes:  Negative for visual disturbance.   Respiratory:  Negative for shortness of breath.    Cardiovascular:  Negative for chest pain.   Gastrointestinal:  Negative for abdominal pain.   Genitourinary:  Negative for dysuria.   Musculoskeletal:  Negative for gait problem.   Neurological:  Negative for headaches.       Objective:      Physical Exam  HENT:      Head: Normocephalic.      Mouth/Throat:      Pharynx: Oropharynx is clear.   Eyes:      Extraocular Movements: Extraocular movements intact.   Cardiovascular:      Rate and Rhythm: Normal rate and regular rhythm.   Pulmonary:      Breath sounds: Normal breath sounds.   Abdominal:      Palpations: Abdomen is soft.   Musculoskeletal:         General: No swelling.   Skin:     General: Skin is warm.   Neurological:      General: No focal deficit present.      Mental Status: He is alert and oriented to person, place, and time.   Psychiatric:         Mood and Affect: Mood normal.       Vitals:    11/21/22 1255   BP: 118/72   Pulse: 66   Resp: 16   Temp: 98 °F (36.7 °C)   SpO2: 96%   Weight: 77.6 kg (170 lb 15.8 oz)   Height: 5' 9" (1.753 m)      Assessment:       Problem List Items Addressed This Visit          Cardiac/Vascular    Primary hypertension    Relevant Orders    CBC Auto Differential    Comprehensive Metabolic Panel    Lipid Panel    PSA, Screening    Urinalysis, Reflex to Urine Culture Urine, Clean Catch    T4, Free    TSH    Dyslipidemia    Relevant Orders    CBC Auto Differential    Comprehensive Metabolic Panel    Lipid Panel    PSA, Screening    Urinalysis, Reflex to Urine Culture Urine, Clean Catch    T4, Free    TSH    Coronary artery disease " involving native coronary artery of native heart without angina pectoris    Relevant Orders    CBC Auto Differential    Comprehensive Metabolic Panel    Lipid Panel    PSA, Screening    Urinalysis, Reflex to Urine Culture Urine, Clean Catch    T4, Free    TSH       Renal/    Prostate cancer screening    Relevant Orders    CBC Auto Differential    Comprehensive Metabolic Panel    Lipid Panel    PSA, Screening    Urinalysis, Reflex to Urine Culture Urine, Clean Catch    T4, Free    TSH       Endocrine    Other specified hypothyroidism    Relevant Orders    CBC Auto Differential    Comprehensive Metabolic Panel    Lipid Panel    PSA, Screening    Urinalysis, Reflex to Urine Culture Urine, Clean Catch    T4, Free    TSH       Other    Well adult exam - Primary    Relevant Orders    CBC Auto Differential    Comprehensive Metabolic Panel    Lipid Panel    PSA, Screening    Urinalysis, Reflex to Urine Culture Urine, Clean Catch    T4, Free    TSH       Medication List with Changes/Refills   New Medications    ASPIRIN (ECOTRIN) 81 MG EC TABLET    Take 1 tablet (81 mg total) by mouth once daily.   Current Medications    ATORVASTATIN (LIPITOR) 20 MG TABLET    Take 20 mg by mouth once daily.    CARVEDILOL (COREG) 25 MG TABLET    Take 25 mg by mouth 2 (two) times daily.    CLOBETASOL (OLUX) 0.05 % FOAM    Apply topically twice daily.    LEVOTHYROXINE (SYNTHROID) 88 MCG TABLET    Take 88 mcg by mouth once daily.    PANTOPRAZOLE (PROTONIX) 40 MG TABLET    Take 40 mg by mouth once daily.        Plan:       1. Hodgkin's lymphoma: He is followed by DESHAWN Li every 12 months. He was seen by Dr. Robin Hyatt many years ago initially    2. Hypothyroidism: Stable on meds    3. Gastroesophageal Reflux disease: On Protonix. Dysphagia to solids, now followed by Dr. Bird Trivedi    4. Psoriasis: He uses clobetasol foam on the left side of his head    5. Headaches: Related to sinusitis    6. Hypertension: His cardiologist, Dr. Patel,  following    7. History of cardiomyopathy: Because of chemotherapy. Continue medical management    8. Coronary artery disease: He had a stent placed in the RCA by Dr. Patel in 2018 at [facility] after presenting with chest pain and myocardial infarction. Continue aspirin and statin    9. Hearing loss: Followed by Dr. Castrejon    10. Wellness: Refer for C-scope. PSA normal. Doesn't want flu shot       Elevated bilirubin: Ultrasound and CT abdomen and pelvis were unrevealing in 2020, benign    He works for [employer], natural gas. He lives near [facility] in [place] on [address]. He has 2 daughters, one in the [employer].    He cycles often

## 2023-02-16 ENCOUNTER — DOCUMENTATION ONLY (OUTPATIENT)
Dept: INTERNAL MEDICINE | Facility: CLINIC | Age: 48
End: 2023-02-16

## 2023-02-16 LAB — CRC RECOMMENDATION EXT: NORMAL

## 2023-02-20 PROBLEM — Z00.00 WELL ADULT EXAM: Status: RESOLVED | Noted: 2022-11-21 | Resolved: 2023-02-20

## 2023-04-25 ENCOUNTER — OFFICE VISIT (OUTPATIENT)
Dept: INTERNAL MEDICINE | Facility: CLINIC | Age: 48
End: 2023-04-25
Payer: COMMERCIAL

## 2023-04-25 VITALS
RESPIRATION RATE: 16 BRPM | DIASTOLIC BLOOD PRESSURE: 68 MMHG | WEIGHT: 165 LBS | TEMPERATURE: 99 F | HEIGHT: 69 IN | BODY MASS INDEX: 24.44 KG/M2 | HEART RATE: 82 BPM | OXYGEN SATURATION: 99 % | SYSTOLIC BLOOD PRESSURE: 110 MMHG

## 2023-04-25 DIAGNOSIS — I10 PRIMARY HYPERTENSION: Primary | ICD-10-CM

## 2023-04-25 DIAGNOSIS — Z12.5 PROSTATE CANCER SCREENING: ICD-10-CM

## 2023-04-25 DIAGNOSIS — E78.5 DYSLIPIDEMIA: ICD-10-CM

## 2023-04-25 DIAGNOSIS — Z00.00 WELL ADULT EXAM: ICD-10-CM

## 2023-04-25 DIAGNOSIS — I42.9 SECONDARY CARDIOMYOPATHY: ICD-10-CM

## 2023-04-25 DIAGNOSIS — E03.8 OTHER SPECIFIED HYPOTHYROIDISM: ICD-10-CM

## 2023-04-25 DIAGNOSIS — I25.10 CORONARY ARTERY DISEASE INVOLVING NATIVE CORONARY ARTERY OF NATIVE HEART WITHOUT ANGINA PECTORIS: ICD-10-CM

## 2023-04-25 PROCEDURE — 1160F PR REVIEW ALL MEDS BY PRESCRIBER/CLIN PHARMACIST DOCUMENTED: ICD-10-PCS | Mod: CPTII,,, | Performed by: INTERNAL MEDICINE

## 2023-04-25 PROCEDURE — 3008F PR BODY MASS INDEX (BMI) DOCUMENTED: ICD-10-PCS | Mod: CPTII,,, | Performed by: INTERNAL MEDICINE

## 2023-04-25 PROCEDURE — 3078F PR MOST RECENT DIASTOLIC BLOOD PRESSURE < 80 MM HG: ICD-10-PCS | Mod: CPTII,,, | Performed by: INTERNAL MEDICINE

## 2023-04-25 PROCEDURE — 1159F PR MEDICATION LIST DOCUMENTED IN MEDICAL RECORD: ICD-10-PCS | Mod: CPTII,,, | Performed by: INTERNAL MEDICINE

## 2023-04-25 PROCEDURE — 99214 OFFICE O/P EST MOD 30 MIN: CPT | Mod: ,,, | Performed by: INTERNAL MEDICINE

## 2023-04-25 PROCEDURE — 3078F DIAST BP <80 MM HG: CPT | Mod: CPTII,,, | Performed by: INTERNAL MEDICINE

## 2023-04-25 PROCEDURE — 3074F PR MOST RECENT SYSTOLIC BLOOD PRESSURE < 130 MM HG: ICD-10-PCS | Mod: CPTII,,, | Performed by: INTERNAL MEDICINE

## 2023-04-25 PROCEDURE — 3008F BODY MASS INDEX DOCD: CPT | Mod: CPTII,,, | Performed by: INTERNAL MEDICINE

## 2023-04-25 PROCEDURE — 1159F MED LIST DOCD IN RCRD: CPT | Mod: CPTII,,, | Performed by: INTERNAL MEDICINE

## 2023-04-25 PROCEDURE — 3074F SYST BP LT 130 MM HG: CPT | Mod: CPTII,,, | Performed by: INTERNAL MEDICINE

## 2023-04-25 PROCEDURE — 99214 PR OFFICE/OUTPT VISIT, EST, LEVL IV, 30-39 MIN: ICD-10-PCS | Mod: ,,, | Performed by: INTERNAL MEDICINE

## 2023-04-25 PROCEDURE — 1160F RVW MEDS BY RX/DR IN RCRD: CPT | Mod: CPTII,,, | Performed by: INTERNAL MEDICINE

## 2023-04-25 RX ORDER — CARVEDILOL 12.5 MG/1
12.5 TABLET ORAL 2 TIMES DAILY
COMMUNITY
Start: 2023-03-30

## 2023-04-25 NOTE — PROGRESS NOTES
"Subjective:       Patient ID: Rei Long is a 48 y.o. male.    Chief Complaint: Follow-up    48-year-old white male is here for follow-up of coronary artery disease, hypothyroidism, and hypertension among other conditions.  He was hospitalized about 2 weeks ago for right-sided pneumonia at [facility].  He presented with hemoptysis and had bronchoscopy showing fungal infection and was discharged home on Augmentin and fluconazole.  Symptoms of hemoptysis, cough, and shortness of breath have resolved    Review of Systems   Constitutional:  Negative for fever.   HENT:  Negative for nosebleeds.    Eyes:  Negative for visual disturbance.   Respiratory:  Negative for shortness of breath.    Cardiovascular:  Negative for chest pain.   Gastrointestinal:  Negative for abdominal pain.   Genitourinary:  Negative for dysuria.   Musculoskeletal:  Negative for gait problem.   Neurological:  Negative for headaches.       Objective:      Physical Exam  HENT:      Head: Normocephalic.      Mouth/Throat:      Pharynx: Oropharynx is clear.   Eyes:      Extraocular Movements: Extraocular movements intact.   Cardiovascular:      Rate and Rhythm: Normal rate and regular rhythm.   Pulmonary:      Breath sounds: Normal breath sounds.   Abdominal:      Palpations: Abdomen is soft.   Musculoskeletal:         General: No swelling.   Skin:     General: Skin is warm.   Neurological:      General: No focal deficit present.      Mental Status: He is alert and oriented to person, place, and time.   Psychiatric:         Mood and Affect: Mood normal.       Vitals:    04/25/23 1419   BP: 110/68   Pulse: 82   Resp: 16   Temp: 98.5 °F (36.9 °C)   SpO2: 99%   Weight: 74.8 kg (165 lb)   Height: 5' 9" (1.753 m)      Assessment:       Problem List Items Addressed This Visit          Cardiac/Vascular    Secondary cardiomyopathy    Primary hypertension - Primary    Dyslipidemia    Coronary artery disease involving native coronary artery of native heart " without angina pectoris       Renal/    Prostate cancer screening       Endocrine    Other specified hypothyroidism       Other    RESOLVED: Well adult exam       Medication List with Changes/Refills   Current Medications    ASPIRIN (ECOTRIN) 81 MG EC TABLET    Take 1 tablet (81 mg total) by mouth once daily.    ATORVASTATIN (LIPITOR) 20 MG TABLET    Take 20 mg by mouth once daily.    CARVEDILOL (COREG) 12.5 MG TABLET    Take 12.5 mg by mouth 2 (two) times daily.    CLOBETASOL (OLUX) 0.05 % FOAM    Apply topically twice daily.    LEVOTHYROXINE (SYNTHROID) 88 MCG TABLET    Take 1 tablet by mouth daily.    PANTOPRAZOLE (PROTONIX) 40 MG TABLET    Take 1 tablet by mouth daily.   Discontinued Medications    CARVEDILOL (COREG) 25 MG TABLET    Take 25 mg by mouth 2 (two) times daily.        Plan:       1. Hodgkin's lymphoma: He is followed by DESHAWN Li every 12 months. He was seen by Dr. Robin Hyatt many years ago initially    2. Hypothyroidism: Stable on meds    3. Gastroesophageal Reflux disease: On Protonix. Dysphagia to solids, now followed by Dr. Bird Trivedi    4. Psoriasis: He uses clobetasol foam on the left side of his head    5. Headaches: Related to sinusitis, improved    6. Hypertension: His cardiologist, Dr. Patel, following    7. History of cardiomyopathy: Because of chemotherapy. Continue medical management    8. Coronary artery disease: He had a stent placed in the RCA by Dr. Patel in 2018 at [facility] after presenting with chest pain and myocardial infarction. Continue aspirin and statin    9. Hearing loss: Followed by Dr. Castrejon    10. Pneumonia: Hospitalized in 04/2023, discharged 10 days ago on Augmentin and fluconazole.  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  He has had full resolution of symptoms    11. Wellness: C-scope 2/2023, repeat 5 years. PSA normal. Doesn't want flu shots       Elevated bilirubin: Ultrasound and CT abdomen and  pelvis were unrevealing in 2020, benign    He works for [employer], natural gas. He [place] near [facility] [place] on [address]. He has 2 daughters, one in the [employer].    He cycles often      Transitional Care Note    Family and/or Caretaker present at visit?  No.  Diagnostic tests reviewed/disposition: No diagnosic tests pending after this hospitalization.  Disease/illness education: yes  Home health/community services discussion/referrals: Patient does not have home health established from hospital visit.  They do not need home health.  If needed, we will set up home health for the patient.   Establishment or re-establishment of referral orders for community resources: No other necessary community resources.   Discussion with other health care providers: No discussion with other health care providers necessary.

## 2023-10-31 DIAGNOSIS — Z00.00 WELL ADULT EXAM: ICD-10-CM

## 2023-10-31 RX ORDER — LEVOTHYROXINE SODIUM 88 UG/1
TABLET ORAL
Qty: 90 TABLET | Refills: 3 | Status: SHIPPED | OUTPATIENT
Start: 2023-10-31

## 2023-10-31 RX ORDER — PANTOPRAZOLE SODIUM 40 MG/1
TABLET, DELAYED RELEASE ORAL
Qty: 90 TABLET | Refills: 3 | Status: SHIPPED | OUTPATIENT
Start: 2023-10-31

## 2023-11-17 ENCOUNTER — PATIENT MESSAGE (OUTPATIENT)
Dept: INTERNAL MEDICINE | Facility: CLINIC | Age: 48
End: 2023-11-17
Payer: COMMERCIAL

## 2023-11-28 ENCOUNTER — OFFICE VISIT (OUTPATIENT)
Dept: INTERNAL MEDICINE | Facility: CLINIC | Age: 48
End: 2023-11-28
Payer: COMMERCIAL

## 2023-11-28 VITALS
SYSTOLIC BLOOD PRESSURE: 112 MMHG | OXYGEN SATURATION: 98 % | HEART RATE: 64 BPM | WEIGHT: 162 LBS | BODY MASS INDEX: 23.99 KG/M2 | RESPIRATION RATE: 16 BRPM | TEMPERATURE: 98 F | DIASTOLIC BLOOD PRESSURE: 68 MMHG | HEIGHT: 69 IN

## 2023-11-28 DIAGNOSIS — I10 PRIMARY HYPERTENSION: ICD-10-CM

## 2023-11-28 DIAGNOSIS — I42.9 SECONDARY CARDIOMYOPATHY: ICD-10-CM

## 2023-11-28 DIAGNOSIS — I25.10 CORONARY ARTERY DISEASE INVOLVING NATIVE CORONARY ARTERY OF NATIVE HEART WITHOUT ANGINA PECTORIS: ICD-10-CM

## 2023-11-28 DIAGNOSIS — E03.8 OTHER SPECIFIED HYPOTHYROIDISM: ICD-10-CM

## 2023-11-28 DIAGNOSIS — Z12.5 PROSTATE CANCER SCREENING: ICD-10-CM

## 2023-11-28 DIAGNOSIS — E78.5 DYSLIPIDEMIA: ICD-10-CM

## 2023-11-28 DIAGNOSIS — Z00.00 WELL ADULT EXAM: Primary | ICD-10-CM

## 2023-11-28 PROCEDURE — 1160F RVW MEDS BY RX/DR IN RCRD: CPT | Mod: CPTII,,, | Performed by: INTERNAL MEDICINE

## 2023-11-28 PROCEDURE — 3008F PR BODY MASS INDEX (BMI) DOCUMENTED: ICD-10-PCS | Mod: CPTII,,, | Performed by: INTERNAL MEDICINE

## 2023-11-28 PROCEDURE — 3078F PR MOST RECENT DIASTOLIC BLOOD PRESSURE < 80 MM HG: ICD-10-PCS | Mod: CPTII,,, | Performed by: INTERNAL MEDICINE

## 2023-11-28 PROCEDURE — 3074F SYST BP LT 130 MM HG: CPT | Mod: CPTII,,, | Performed by: INTERNAL MEDICINE

## 2023-11-28 PROCEDURE — 1159F MED LIST DOCD IN RCRD: CPT | Mod: CPTII,,, | Performed by: INTERNAL MEDICINE

## 2023-11-28 PROCEDURE — 1159F PR MEDICATION LIST DOCUMENTED IN MEDICAL RECORD: ICD-10-PCS | Mod: CPTII,,, | Performed by: INTERNAL MEDICINE

## 2023-11-28 PROCEDURE — 1160F PR REVIEW ALL MEDS BY PRESCRIBER/CLIN PHARMACIST DOCUMENTED: ICD-10-PCS | Mod: CPTII,,, | Performed by: INTERNAL MEDICINE

## 2023-11-28 PROCEDURE — 99396 PREV VISIT EST AGE 40-64: CPT | Mod: ,,, | Performed by: INTERNAL MEDICINE

## 2023-11-28 PROCEDURE — 3008F BODY MASS INDEX DOCD: CPT | Mod: CPTII,,, | Performed by: INTERNAL MEDICINE

## 2023-11-28 PROCEDURE — 3078F DIAST BP <80 MM HG: CPT | Mod: CPTII,,, | Performed by: INTERNAL MEDICINE

## 2023-11-28 PROCEDURE — 3074F PR MOST RECENT SYSTOLIC BLOOD PRESSURE < 130 MM HG: ICD-10-PCS | Mod: CPTII,,, | Performed by: INTERNAL MEDICINE

## 2023-11-28 PROCEDURE — 99396 PR PREVENTIVE VISIT,EST,40-64: ICD-10-PCS | Mod: ,,, | Performed by: INTERNAL MEDICINE

## 2023-11-28 RX ORDER — GABAPENTIN 100 MG/1
100 CAPSULE ORAL 3 TIMES DAILY PRN
Qty: 90 CAPSULE | Refills: 0 | Status: SHIPPED | OUTPATIENT
Start: 2023-11-28 | End: 2024-11-27

## 2023-11-28 NOTE — PROGRESS NOTES
"Subjective:       Patient ID: Rei Long is a 48 y.o. male.    Chief Complaint: Annual Exam    48-year-old white male is here for follow-up of coronary artery disease, hypothyroidism, and hypertension among other conditions.       Review of Systems   Constitutional:  Negative for fever.   HENT:  Negative for nosebleeds.    Eyes:  Negative for visual disturbance.   Respiratory:  Negative for shortness of breath.    Cardiovascular:  Negative for chest pain.   Gastrointestinal:  Negative for abdominal pain.   Genitourinary:  Negative for dysuria.   Musculoskeletal:  Negative for gait problem.   Neurological:  Negative for headaches.         Objective:      Physical Exam  HENT:      Head: Normocephalic.      Mouth/Throat:      Pharynx: Oropharynx is clear.   Eyes:      Extraocular Movements: Extraocular movements intact.   Cardiovascular:      Rate and Rhythm: Normal rate and regular rhythm.   Pulmonary:      Breath sounds: Normal breath sounds.   Abdominal:      Palpations: Abdomen is soft.   Musculoskeletal:         General: No swelling.   Skin:     General: Skin is warm.   Neurological:      General: No focal deficit present.      Mental Status: He is alert and oriented to person, place, and time.   Psychiatric:         Mood and Affect: Mood normal.         Vitals:    11/28/23 1309   BP: 112/68   Pulse: 64   Resp: 16   Temp: 98.1 °F (36.7 °C)   SpO2: 98%   Weight: 73.5 kg (162 lb)   Height: 5' 9" (1.753 m)      Assessment:       Problem List Items Addressed This Visit          Cardiac/Vascular    Secondary cardiomyopathy    Relevant Orders    CBC Auto Differential    Comprehensive Metabolic Panel    Lipid Panel    PSA, Screening    Urinalysis, Reflex to Urine Culture    TSH    Primary hypertension    Relevant Orders    CBC Auto Differential    Comprehensive Metabolic Panel    Lipid Panel    PSA, Screening    Urinalysis, Reflex to Urine Culture    TSH    Dyslipidemia    Relevant Orders    CBC Auto Differential    " Comprehensive Metabolic Panel    Lipid Panel    PSA, Screening    Urinalysis, Reflex to Urine Culture    TSH    Coronary artery disease involving native coronary artery of native heart without angina pectoris    Relevant Orders    CBC Auto Differential    Comprehensive Metabolic Panel    Lipid Panel    PSA, Screening    Urinalysis, Reflex to Urine Culture    TSH       Renal/    Prostate cancer screening    Relevant Orders    CBC Auto Differential    Comprehensive Metabolic Panel    Lipid Panel    PSA, Screening    Urinalysis, Reflex to Urine Culture    TSH       Endocrine    Other specified hypothyroidism    Relevant Orders    CBC Auto Differential    Comprehensive Metabolic Panel    Lipid Panel    PSA, Screening    Urinalysis, Reflex to Urine Culture    TSH       Other    RESOLVED: Well adult exam - Primary    Relevant Orders    CBC Auto Differential    Comprehensive Metabolic Panel    Lipid Panel    PSA, Screening    Urinalysis, Reflex to Urine Culture    TSH       Medication List with Changes/Refills   Current Medications    ASPIRIN (ECOTRIN) 81 MG EC TABLET    Take 1 tablet (81 mg total) by mouth once daily.    ATORVASTATIN (LIPITOR) 20 MG TABLET    Take 20 mg by mouth once daily.    CARVEDILOL (COREG) 12.5 MG TABLET    Take 12.5 mg by mouth 2 (two) times daily.    CLOBETASOL (OLUX) 0.05 % FOAM    Apply topically twice daily.    LEVOTHYROXINE (SYNTHROID) 88 MCG TABLET    Take 1 tablet by mouth daily.    PANTOPRAZOLE (PROTONIX) 40 MG TABLET    Take 1 tablet by mouth daily.        Plan:       1. Hodgkin's lymphoma: He is followed by DESHAWN Li every 12 months. He was seen by Dr. Robin Hyatt many years ago initially    2. Hypothyroidism: Stable on meds    3. Gastroesophageal Reflux disease: On Protonix. Dysphagia to solids, now followed by Dr. Bird Trivedi    4. Psoriasis: He uses clobetasol foam on the left side of his head    5. Headaches: Related to sinusitis, improved    6. Hypertension: His cardiologist,  Dr. Patel, following    7. History of cardiomyopathy: Because of chemotherapy. Continue medical management    8. Coronary artery disease: He had a stent placed in the RCA by Dr. Patel in 2018 at [facility] after presenting with chest pain and myocardial infarction. Continue aspirin and statin    9. Hearing loss: Followed by Dr. Brandon Castrejon    10. Pneumonia: Hospitalized in 4/2023.  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 Resolved    11. Neuropathy: Start gabapentin as needed, especially when he hunts in [place]    12. Wellness: C-scope 2/2023, repeat 5 years. PSA normal. Doesn't want flu shots     Check fasting labs this week    Elevated bilirubin: Ultrasound and CT abdomen and pelvis were unrevealing in 2020, benign    He works for [employer], natural gas. He lives near [facility] in [place] on [address] acres. He has 2 daughters.    He cycles often

## 2023-12-04 ENCOUNTER — LAB VISIT (OUTPATIENT)
Dept: LAB | Facility: HOSPITAL | Age: 48
End: 2023-12-04
Attending: INTERNAL MEDICINE
Payer: COMMERCIAL

## 2023-12-04 ENCOUNTER — TELEPHONE (OUTPATIENT)
Dept: INTERNAL MEDICINE | Facility: CLINIC | Age: 48
End: 2023-12-04
Payer: COMMERCIAL

## 2023-12-04 DIAGNOSIS — Z00.00 WELL ADULT EXAM: ICD-10-CM

## 2023-12-04 DIAGNOSIS — I10 PRIMARY HYPERTENSION: ICD-10-CM

## 2023-12-04 DIAGNOSIS — E78.5 DYSLIPIDEMIA: ICD-10-CM

## 2023-12-04 DIAGNOSIS — I25.10 CORONARY ARTERY DISEASE INVOLVING NATIVE CORONARY ARTERY OF NATIVE HEART WITHOUT ANGINA PECTORIS: ICD-10-CM

## 2023-12-04 DIAGNOSIS — Z12.5 PROSTATE CANCER SCREENING: ICD-10-CM

## 2023-12-04 DIAGNOSIS — E03.8 OTHER SPECIFIED HYPOTHYROIDISM: ICD-10-CM

## 2023-12-04 LAB
ALBUMIN SERPL-MCNC: 4 G/DL (ref 3.5–5)
ALBUMIN/GLOB SERPL: 1.7 RATIO (ref 1.1–2)
ALP SERPL-CCNC: 72 UNIT/L (ref 40–150)
ALT SERPL-CCNC: 38 UNIT/L (ref 0–55)
APPEARANCE UR: CLEAR
AST SERPL-CCNC: 25 UNIT/L (ref 5–34)
BACTERIA #/AREA URNS AUTO: ABNORMAL /HPF
BASOPHILS # BLD AUTO: 0.03 X10(3)/MCL
BASOPHILS NFR BLD AUTO: 0.6 %
BILIRUB SERPL-MCNC: 3 MG/DL
BILIRUB UR QL STRIP.AUTO: NEGATIVE
BUN SERPL-MCNC: 17.7 MG/DL (ref 8.9–20.6)
CALCIUM SERPL-MCNC: 9.7 MG/DL (ref 8.4–10.2)
CHLORIDE SERPL-SCNC: 104 MMOL/L (ref 98–107)
CHOLEST SERPL-MCNC: 124 MG/DL
CHOLEST/HDLC SERPL: 3 {RATIO} (ref 0–5)
CO2 SERPL-SCNC: 32 MMOL/L (ref 22–29)
COLOR UR AUTO: ABNORMAL
CREAT SERPL-MCNC: 1.06 MG/DL (ref 0.73–1.18)
EOSINOPHIL # BLD AUTO: 0.12 X10(3)/MCL (ref 0–0.9)
EOSINOPHIL NFR BLD AUTO: 2.5 %
ERYTHROCYTE [DISTWIDTH] IN BLOOD BY AUTOMATED COUNT: 12.5 % (ref 11.5–17)
GFR SERPLBLD CREATININE-BSD FMLA CKD-EPI: >60 MLS/MIN/1.73/M2
GLOBULIN SER-MCNC: 2.3 GM/DL (ref 2.4–3.5)
GLUCOSE SERPL-MCNC: 99 MG/DL (ref 74–100)
GLUCOSE UR QL STRIP.AUTO: NORMAL
HCT VFR BLD AUTO: 46.8 % (ref 42–52)
HDLC SERPL-MCNC: 47 MG/DL (ref 35–60)
HGB BLD-MCNC: 16.3 G/DL (ref 14–18)
HYALINE CASTS #/AREA URNS LPF: ABNORMAL /LPF
IMM GRANULOCYTES # BLD AUTO: 0.01 X10(3)/MCL (ref 0–0.04)
IMM GRANULOCYTES NFR BLD AUTO: 0.2 %
KETONES UR QL STRIP.AUTO: NEGATIVE
LDLC SERPL CALC-MCNC: 64 MG/DL (ref 50–140)
LEUKOCYTE ESTERASE UR QL STRIP.AUTO: NEGATIVE
LYMPHOCYTES # BLD AUTO: 1.38 X10(3)/MCL (ref 0.6–4.6)
LYMPHOCYTES NFR BLD AUTO: 29 %
MCH RBC QN AUTO: 32 PG (ref 27–31)
MCHC RBC AUTO-ENTMCNC: 34.8 G/DL (ref 33–36)
MCV RBC AUTO: 91.8 FL (ref 80–94)
MONOCYTES # BLD AUTO: 0.42 X10(3)/MCL (ref 0.1–1.3)
MONOCYTES NFR BLD AUTO: 8.8 %
MUCOUS THREADS URNS QL MICRO: ABNORMAL /LPF
NEUTROPHILS # BLD AUTO: 2.8 X10(3)/MCL (ref 2.1–9.2)
NEUTROPHILS NFR BLD AUTO: 58.9 %
NITRITE UR QL STRIP.AUTO: NEGATIVE
NRBC BLD AUTO-RTO: 0 %
PH UR STRIP.AUTO: 6 [PH]
PLATELET # BLD AUTO: 167 X10(3)/MCL (ref 130–400)
PMV BLD AUTO: 9.5 FL (ref 7.4–10.4)
POTASSIUM SERPL-SCNC: 4.2 MMOL/L (ref 3.5–5.1)
PROT SERPL-MCNC: 6.3 GM/DL (ref 6.4–8.3)
PROT UR QL STRIP.AUTO: NEGATIVE
PSA SERPL-MCNC: 0.5 NG/ML
RBC # BLD AUTO: 5.1 X10(6)/MCL (ref 4.7–6.1)
RBC #/AREA URNS AUTO: ABNORMAL /HPF
RBC UR QL AUTO: ABNORMAL
SODIUM SERPL-SCNC: 143 MMOL/L (ref 136–145)
SP GR UR STRIP.AUTO: 1.02 (ref 1–1.03)
SQUAMOUS #/AREA URNS LPF: ABNORMAL /HPF
T4 FREE SERPL-MCNC: 1.02 NG/DL (ref 0.7–1.48)
TRIGL SERPL-MCNC: 63 MG/DL (ref 34–140)
TSH SERPL-ACNC: 1.41 UIU/ML (ref 0.35–4.94)
UROBILINOGEN UR STRIP-ACNC: NORMAL
VLDLC SERPL CALC-MCNC: 13 MG/DL
WBC # SPEC AUTO: 4.76 X10(3)/MCL (ref 4.5–11.5)
WBC #/AREA URNS AUTO: ABNORMAL /HPF

## 2023-12-04 PROCEDURE — 85025 COMPLETE CBC W/AUTO DIFF WBC: CPT

## 2023-12-04 PROCEDURE — 80061 LIPID PANEL: CPT

## 2023-12-04 PROCEDURE — 84439 ASSAY OF FREE THYROXINE: CPT

## 2023-12-04 PROCEDURE — 84443 ASSAY THYROID STIM HORMONE: CPT

## 2023-12-04 PROCEDURE — 36415 COLL VENOUS BLD VENIPUNCTURE: CPT

## 2023-12-04 PROCEDURE — 81001 URINALYSIS AUTO W/SCOPE: CPT

## 2023-12-04 PROCEDURE — 80053 COMPREHEN METABOLIC PANEL: CPT

## 2023-12-04 PROCEDURE — 84153 ASSAY OF PSA TOTAL: CPT

## 2023-12-04 NOTE — TELEPHONE ENCOUNTER
----- Message from Arturo Jimenez II, MD sent at 12/4/2023 11:56 AM CST -----  Please call him with normal labs.  Blood work looked fine.  Initially, the chemical dipstick test showed that there might be some blood in his urine, but it was negative under the microscope.  There is no blood in his urine.

## 2024-04-20 ENCOUNTER — OFFICE VISIT (OUTPATIENT)
Dept: URGENT CARE | Facility: CLINIC | Age: 49
End: 2024-04-20
Payer: COMMERCIAL

## 2024-04-20 VITALS
SYSTOLIC BLOOD PRESSURE: 123 MMHG | HEART RATE: 102 BPM | DIASTOLIC BLOOD PRESSURE: 74 MMHG | HEIGHT: 69 IN | TEMPERATURE: 99 F | BODY MASS INDEX: 23.99 KG/M2 | RESPIRATION RATE: 17 BRPM | WEIGHT: 162 LBS | OXYGEN SATURATION: 98 %

## 2024-04-20 DIAGNOSIS — S99.929A INJURY OF TOE, UNSPECIFIED LATERALITY, INITIAL ENCOUNTER: Primary | ICD-10-CM

## 2024-04-20 PROCEDURE — 99202 OFFICE O/P NEW SF 15 MIN: CPT | Mod: ,,,

## 2024-04-20 NOTE — PROGRESS NOTES
"Subjective:      Patient ID: Rei Long is a 49 y.o. male.    Vitals:  height is 5' 9" (1.753 m) and weight is 73.5 kg (162 lb). His temperature is 98.7 °F (37.1 °C). His blood pressure is 123/74 and his pulse is 102. His respiration is 17 and oxygen saturation is 98%.     Chief Complaint: Insect Bite    Patient is a 49 y.o. male who presents to urgent care stating that about 20 minutes ago he stepped on something and felt a prick to his right great toe.  He thought there was swelling but once he got here he noticed that there was no swelling.     Insect Bite    ROS   Objective:     Physical Exam   Constitutional: He is oriented to person, place, and time.  Non-toxic appearance. He does not appear ill.   Cardiovascular: Normal rate and normal pulses.   Pulmonary/Chest: Effort normal.   Neurological: He is alert and oriented to person, place, and time.   Skin: Skin is no rash.   Nursing note and vitals reviewed.    No swelling, drainage, redness, bite noted to great toe at this time.     Assessment:     1. Injury of toe, unspecified laterality, initial encounter        Plan:       Injury of toe, unspecified laterality, initial encounter      By the time patient got here there was no swelling, redness, drainage noted to toe.  He agreed.  No treatment at this time.               "

## 2024-09-23 DIAGNOSIS — Z00.00 WELL ADULT EXAM: ICD-10-CM

## 2024-09-23 RX ORDER — PANTOPRAZOLE SODIUM 40 MG/1
40 TABLET, DELAYED RELEASE ORAL DAILY
Qty: 90 TABLET | Refills: 3 | Status: SHIPPED | OUTPATIENT
Start: 2024-09-23 | End: 2025-09-23

## 2024-09-23 RX ORDER — LEVOTHYROXINE SODIUM 88 UG/1
88 TABLET ORAL DAILY
Qty: 90 TABLET | Refills: 3 | Status: SHIPPED | OUTPATIENT
Start: 2024-09-23 | End: 2025-09-23

## 2024-11-22 ENCOUNTER — TELEPHONE (OUTPATIENT)
Dept: INTERNAL MEDICINE | Facility: CLINIC | Age: 49
End: 2024-11-22
Payer: COMMERCIAL

## 2024-12-19 ENCOUNTER — TELEPHONE (OUTPATIENT)
Dept: INTERNAL MEDICINE | Facility: CLINIC | Age: 49
End: 2024-12-19
Payer: COMMERCIAL

## 2024-12-26 ENCOUNTER — OFFICE VISIT (OUTPATIENT)
Dept: INTERNAL MEDICINE | Facility: CLINIC | Age: 49
End: 2024-12-26
Payer: COMMERCIAL

## 2024-12-26 ENCOUNTER — LAB VISIT (OUTPATIENT)
Dept: LAB | Facility: HOSPITAL | Age: 49
End: 2024-12-26
Attending: INTERNAL MEDICINE
Payer: COMMERCIAL

## 2024-12-26 VITALS
BODY MASS INDEX: 25.18 KG/M2 | HEART RATE: 68 BPM | HEIGHT: 69 IN | RESPIRATION RATE: 16 BRPM | OXYGEN SATURATION: 99 % | WEIGHT: 170 LBS | DIASTOLIC BLOOD PRESSURE: 64 MMHG | SYSTOLIC BLOOD PRESSURE: 116 MMHG | TEMPERATURE: 98 F

## 2024-12-26 DIAGNOSIS — Z00.00 WELL ADULT EXAM: Primary | ICD-10-CM

## 2024-12-26 DIAGNOSIS — E78.5 DYSLIPIDEMIA: ICD-10-CM

## 2024-12-26 DIAGNOSIS — I25.10 CORONARY ARTERY DISEASE INVOLVING NATIVE CORONARY ARTERY OF NATIVE HEART WITHOUT ANGINA PECTORIS: ICD-10-CM

## 2024-12-26 DIAGNOSIS — E03.8 OTHER SPECIFIED HYPOTHYROIDISM: ICD-10-CM

## 2024-12-26 DIAGNOSIS — I42.9 SECONDARY CARDIOMYOPATHY: ICD-10-CM

## 2024-12-26 DIAGNOSIS — I10 PRIMARY HYPERTENSION: ICD-10-CM

## 2024-12-26 DIAGNOSIS — Z00.00 WELL ADULT EXAM: ICD-10-CM

## 2024-12-26 DIAGNOSIS — Z12.5 PROSTATE CANCER SCREENING: ICD-10-CM

## 2024-12-26 LAB
ALBUMIN SERPL-MCNC: 4 G/DL (ref 3.5–5)
ALBUMIN/GLOB SERPL: 1.7 RATIO (ref 1.1–2)
ALP SERPL-CCNC: 71 UNIT/L (ref 40–150)
ALT SERPL-CCNC: 26 UNIT/L (ref 0–55)
ANION GAP SERPL CALC-SCNC: 7 MEQ/L
AST SERPL-CCNC: 24 UNIT/L (ref 5–34)
BACTERIA #/AREA URNS AUTO: ABNORMAL /HPF
BASOPHILS # BLD AUTO: 0.04 X10(3)/MCL
BASOPHILS NFR BLD AUTO: 0.8 %
BILIRUB SERPL-MCNC: 3 MG/DL
BILIRUB UR QL STRIP.AUTO: NEGATIVE
BUN SERPL-MCNC: 18.5 MG/DL (ref 8.9–20.6)
CALCIUM SERPL-MCNC: 9 MG/DL (ref 8.4–10.2)
CHLORIDE SERPL-SCNC: 104 MMOL/L (ref 98–107)
CHOLEST SERPL-MCNC: 201 MG/DL
CHOLEST/HDLC SERPL: 4 {RATIO} (ref 0–5)
CLARITY UR: CLEAR
CO2 SERPL-SCNC: 28 MMOL/L (ref 22–29)
COLOR UR AUTO: YELLOW
CREAT SERPL-MCNC: 1.09 MG/DL (ref 0.72–1.25)
CREAT/UREA NIT SERPL: 17
EOSINOPHIL # BLD AUTO: 0.16 X10(3)/MCL (ref 0–0.9)
EOSINOPHIL NFR BLD AUTO: 3.2 %
ERYTHROCYTE [DISTWIDTH] IN BLOOD BY AUTOMATED COUNT: 12.3 % (ref 11.5–17)
GFR SERPLBLD CREATININE-BSD FMLA CKD-EPI: >60 ML/MIN/1.73/M2
GLOBULIN SER-MCNC: 2.3 GM/DL (ref 2.4–3.5)
GLUCOSE SERPL-MCNC: 92 MG/DL (ref 74–100)
GLUCOSE UR QL STRIP: NORMAL
HCT VFR BLD AUTO: 46.8 % (ref 42–52)
HDLC SERPL-MCNC: 45 MG/DL (ref 35–60)
HGB BLD-MCNC: 16.4 G/DL (ref 14–18)
HGB UR QL STRIP: NEGATIVE
IMM GRANULOCYTES # BLD AUTO: 0.01 X10(3)/MCL (ref 0–0.04)
IMM GRANULOCYTES NFR BLD AUTO: 0.2 %
KETONES UR QL STRIP: NEGATIVE
LDLC SERPL CALC-MCNC: 138 MG/DL (ref 50–140)
LEUKOCYTE ESTERASE UR QL STRIP: NEGATIVE
LYMPHOCYTES # BLD AUTO: 1.56 X10(3)/MCL (ref 0.6–4.6)
LYMPHOCYTES NFR BLD AUTO: 31.3 %
MCH RBC QN AUTO: 31.8 PG (ref 27–31)
MCHC RBC AUTO-ENTMCNC: 35 G/DL (ref 33–36)
MCV RBC AUTO: 90.9 FL (ref 80–94)
MONOCYTES # BLD AUTO: 0.54 X10(3)/MCL (ref 0.1–1.3)
MONOCYTES NFR BLD AUTO: 10.8 %
MUCOUS THREADS URNS QL MICRO: ABNORMAL /LPF
NEUTROPHILS # BLD AUTO: 2.68 X10(3)/MCL (ref 2.1–9.2)
NEUTROPHILS NFR BLD AUTO: 53.7 %
NITRITE UR QL STRIP: NEGATIVE
NRBC BLD AUTO-RTO: 0 %
PH UR STRIP: 6 [PH]
PLATELET # BLD AUTO: 183 X10(3)/MCL (ref 130–400)
PMV BLD AUTO: 9.3 FL (ref 7.4–10.4)
POTASSIUM SERPL-SCNC: 4 MMOL/L (ref 3.5–5.1)
PROT SERPL-MCNC: 6.3 GM/DL (ref 6.4–8.3)
PROT UR QL STRIP: NEGATIVE
PSA SERPL-MCNC: 0.47 NG/ML
RBC # BLD AUTO: 5.15 X10(6)/MCL (ref 4.7–6.1)
RBC #/AREA URNS AUTO: ABNORMAL /HPF
SODIUM SERPL-SCNC: 139 MMOL/L (ref 136–145)
SP GR UR STRIP.AUTO: 1.02 (ref 1–1.03)
SQUAMOUS #/AREA URNS LPF: ABNORMAL /HPF
TRIGL SERPL-MCNC: 91 MG/DL (ref 34–140)
TSH SERPL-ACNC: 1.3 UIU/ML (ref 0.35–4.94)
UROBILINOGEN UR STRIP-ACNC: NORMAL
VLDLC SERPL CALC-MCNC: 18 MG/DL
WBC # BLD AUTO: 4.99 X10(3)/MCL (ref 4.5–11.5)
WBC #/AREA URNS AUTO: ABNORMAL /HPF

## 2024-12-26 PROCEDURE — 3074F SYST BP LT 130 MM HG: CPT | Mod: CPTII,,, | Performed by: INTERNAL MEDICINE

## 2024-12-26 PROCEDURE — 84153 ASSAY OF PSA TOTAL: CPT

## 2024-12-26 PROCEDURE — 3078F DIAST BP <80 MM HG: CPT | Mod: CPTII,,, | Performed by: INTERNAL MEDICINE

## 2024-12-26 PROCEDURE — 1160F RVW MEDS BY RX/DR IN RCRD: CPT | Mod: CPTII,,, | Performed by: INTERNAL MEDICINE

## 2024-12-26 PROCEDURE — 80053 COMPREHEN METABOLIC PANEL: CPT

## 2024-12-26 PROCEDURE — 99396 PREV VISIT EST AGE 40-64: CPT | Mod: ,,, | Performed by: INTERNAL MEDICINE

## 2024-12-26 PROCEDURE — 36415 COLL VENOUS BLD VENIPUNCTURE: CPT

## 2024-12-26 PROCEDURE — 85025 COMPLETE CBC W/AUTO DIFF WBC: CPT

## 2024-12-26 PROCEDURE — 81001 URINALYSIS AUTO W/SCOPE: CPT

## 2024-12-26 PROCEDURE — 1159F MED LIST DOCD IN RCRD: CPT | Mod: CPTII,,, | Performed by: INTERNAL MEDICINE

## 2024-12-26 PROCEDURE — 80061 LIPID PANEL: CPT

## 2024-12-26 PROCEDURE — 3008F BODY MASS INDEX DOCD: CPT | Mod: CPTII,,, | Performed by: INTERNAL MEDICINE

## 2024-12-26 PROCEDURE — 84443 ASSAY THYROID STIM HORMONE: CPT

## 2024-12-26 RX ORDER — CARVEDILOL 25 MG/1
25 TABLET ORAL 2 TIMES DAILY
COMMUNITY
Start: 2024-12-19

## 2024-12-26 RX ORDER — ATORVASTATIN CALCIUM 20 MG/1
20 TABLET, FILM COATED ORAL DAILY
Qty: 90 TABLET | Refills: 3 | Status: SHIPPED | OUTPATIENT
Start: 2024-12-26

## 2024-12-26 RX ORDER — FAMOTIDINE 20 MG/1
20 TABLET, FILM COATED ORAL DAILY
COMMUNITY
Start: 2024-12-19

## 2024-12-26 NOTE — PROGRESS NOTES
Subjective:       Patient ID: Rei Long is a 49 y.o. male.    Chief Complaint: Annual Exam    49-year-old white male is here for follow-up of coronary artery disease, hypothyroidism, and hypertension among other conditions.       Review of Systems   Constitutional:  Negative for fever.   HENT:  Negative for nosebleeds.    Eyes:  Negative for visual disturbance.   Respiratory:  Negative for shortness of breath.    Cardiovascular:  Negative for chest pain.   Gastrointestinal:  Negative for abdominal pain.   Genitourinary:  Negative for dysuria.   Musculoskeletal:  Negative for gait problem.   Neurological:  Negative for headaches.         Objective:      Physical Exam  HENT:      Head: Normocephalic.      Mouth/Throat:      Pharynx: Oropharynx is clear.   Eyes:      Extraocular Movements: Extraocular movements intact.   Cardiovascular:      Rate and Rhythm: Normal rate and regular rhythm.   Pulmonary:      Breath sounds: Normal breath sounds.   Abdominal:      Palpations: Abdomen is soft.   Musculoskeletal:         General: No swelling.   Skin:     General: Skin is warm.   Neurological:      General: No focal deficit present.      Mental Status: He is alert and oriented to person, place, and time.   Psychiatric:         Mood and Affect: Mood normal.         There were no vitals filed for this visit.     Assessment:       Problem List Items Addressed This Visit       RESOLVED: Well adult exam - Primary    Prostate cancer screening    Secondary cardiomyopathy    Primary hypertension    Dyslipidemia    Other specified hypothyroidism    Coronary artery disease involving native coronary artery of native heart without angina pectoris       Medication List with Changes/Refills   Current Medications    ASPIRIN (ECOTRIN) 81 MG EC TABLET    Take 1 tablet (81 mg total) by mouth once daily.    ATORVASTATIN (LIPITOR) 20 MG TABLET    Take 20 mg by mouth once daily.    CARVEDILOL (COREG) 12.5 MG TABLET    Take 12.5 mg by mouth 2  (two) times daily.    CLOBETASOL (OLUX) 0.05 % FOAM    Apply topically twice daily.    GABAPENTIN (NEURONTIN) 100 MG CAPSULE    Take 1 capsule (100 mg total) by mouth 3 (three) times daily as needed (Neuropathy).    LEVOTHYROXINE (SYNTHROID) 88 MCG TABLET    Take 1 tablet (88 mcg total) by mouth once daily.    PANTOPRAZOLE (PROTONIX) 40 MG TABLET    Take 1 tablet (40 mg total) by mouth once daily.        Plan:       1. Hodgkin's lymphoma: He is followed by DESHAWN Li every 12 months. He was seen by Dr. Robin Hyatt many years ago initially    2. Hypothyroidism: Stable on meds    3. Gastroesophageal Reflux disease: On Protonix and famotidine. Dysphagia to solids, followed by Dr. Bird Trivedi    4. Psoriasis: He uses clobetasol foam on the left side of his head    5. Headaches: Related to sinusitis, improved    6. Hypertension: His cardiologist, Dr. Patel, following    7. History of cardiomyopathy: Because of chemotherapy. Continue medical management    8. Coronary artery disease: He had a stent placed in the RCA by Dr. Patel in 2018 at [facility] after presenting with chest pain and myocardial infarction. Continue aspirin and statin    9. Hearing loss: Followed by Dr. Brandon Castrejon    10. Pneumonia: Hospitalized in 4/2023.  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 Resolved    11. Neuropathy: Started gabapentin PRN at last visit, especially when he hunts in [place]    12. Wellness: C-scope 2/2023, repeat 5 years. PSA normal. 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 He has 2 daughters.    He cycles often

## 2025-02-04 ENCOUNTER — OFFICE VISIT (OUTPATIENT)
Dept: URGENT CARE | Facility: CLINIC | Age: 50
End: 2025-02-04
Payer: COMMERCIAL

## 2025-02-04 VITALS
TEMPERATURE: 98 F | HEART RATE: 88 BPM | BODY MASS INDEX: 23.99 KG/M2 | HEIGHT: 69 IN | DIASTOLIC BLOOD PRESSURE: 68 MMHG | WEIGHT: 162 LBS | RESPIRATION RATE: 18 BRPM | SYSTOLIC BLOOD PRESSURE: 100 MMHG | OXYGEN SATURATION: 100 %

## 2025-02-04 DIAGNOSIS — K52.9 GASTROENTERITIS: Primary | ICD-10-CM

## 2025-02-04 PROCEDURE — 99214 OFFICE O/P EST MOD 30 MIN: CPT | Mod: ,,, | Performed by: FAMILY MEDICINE

## 2025-02-04 RX ORDER — HYOSCYAMINE SULFATE 0.12 MG/1
0.12 TABLET SUBLINGUAL EVERY 6 HOURS PRN
Qty: 12 TABLET | Refills: 0 | Status: SHIPPED | OUTPATIENT
Start: 2025-02-04 | End: 2025-02-07

## 2025-02-04 NOTE — PATIENT INSTRUCTIONS
Plan:   Medications sent to pharmacy.  Take as needed for diarrhea  Hydrate with Pedialyte, Gatorade or powerade. When you decide to eat, keep your diet simple and bland. Bread, crackers, bananas, rice or broth for example. You can take immodium over the counter for non-bloody diarrhea. If there is blood in your diarrhea or vomit, seek medical attention immediately. If you develop abdominal pain or you have worsening of your abdominal pain, seek medical attention immediately in the emergency department.  As discussed, if your diarrhea last for more than 4 days and is still watery, obtain a sample using the supplies provided and drop off at the clinic per the nurse's instructions.

## 2025-02-04 NOTE — PROGRESS NOTES
"Subjective:      Patient ID: Rei Long is a 50 y.o. male.    Vitals:  height is 5' 9" (1.753 m) and weight is 73.5 kg (162 lb). His temperature is 98.4 °F (36.9 °C). His blood pressure is 100/68 and his pulse is 88. His respiration is 18 and oxygen saturation is 100%.     Chief Complaint: Diarrhea     Patient is a 50 y.o. male who presents to urgent care with complaints of diarrhea, nausea, vomiting(1 episodes), abdominal pain, light headed x2 days. Patient denies BA, HA, NC, cough, SOB, CP, wheezing, fever, chills.  Patient denies anyone at home or at work with similar symptoms.  Denies any smoking.  Did have a drink of alcohol the night before this began but states he does not drink heavily and  only drinks on weekends.  Denies any sharp or severe abdominal pain.  Denies any blood in the stool.  Denies any fever.  Does have a history of acid reflux but takes medication daily for it.  Denies any bloating or excessive belching.  Denies any headache or body aches.          Constitution: Negative.   HENT: Negative.     Neck: neck negative.   Cardiovascular: Negative.    Eyes: Negative.    Respiratory: Negative.     Gastrointestinal:  Positive for vomiting and diarrhea.   Genitourinary: Negative.    Musculoskeletal: Negative.    Skin: Negative.    Allergic/Immunologic: Negative.    Neurological: Negative.    Hematologic/Lymphatic: Negative.       Objective:     Physical Exam   Constitutional: He is oriented to person, place, and time.  Non-toxic appearance. He does not appear ill. No distress.   HENT:   Head: Normocephalic and atraumatic.   Mouth/Throat: Mucous membranes are moist.   Eyes: Conjunctivae are normal.   Cardiovascular: Normal rate.   Pulmonary/Chest: Effort normal. No respiratory distress.   Abdominal: Normal appearance. He exhibits no distension and no mass. There is no abdominal tenderness. There is no rebound.   Neurological: He is alert and oriented to person, place, and time.   Skin: Skin is not " "diaphoretic. Capillary refill takes less than 2 seconds.   Psychiatric: His behavior is normal. Mood, judgment and thought content normal.   Vitals reviewed.         Previous History      Review of patient's allergies indicates:   Allergen Reactions    Brentuximab vedotin Anaphylaxis, Shortness Of Breath and Swelling     Other reaction(s): Difficulty breathing, difficulty breathing       Past Medical History:   Diagnosis Date    Personal history of colonic polyps      Current Outpatient Medications   Medication Instructions    aspirin (ECOTRIN) 81 mg, Oral, Daily    atorvastatin (LIPITOR) 20 mg, Oral, Daily    carvediloL (COREG) 25 mg, 2 times daily    clobetasoL (OLUX) 0.05 % Foam Apply topically twice daily.    famotidine (PEPCID) 20 mg, Daily    gabapentin (NEURONTIN) 100 mg, Oral, 3 times daily PRN    hyoscyamine 0.125 mg, Sublingual, Every 6 hours PRN    levothyroxine (SYNTHROID) 88 mcg, Oral, Daily    pantoprazole (PROTONIX) 40 mg, Oral, Daily     Past Surgical History:   Procedure Laterality Date    CHOLECYSTECTOMY  12/30/2012    COLONOSCOPY W/ BIOPSIES  02/16/2023    EYE SURGERY  4/2013    LASIK     Family History   Problem Relation Name Age of Onset    Diabetes Father Xiang Long     Heart disease Father Xiang Long         Lenore-Parkinson-White syndrome       Social History     Tobacco Use    Smoking status: Never     Passive exposure: Never    Smokeless tobacco: Never   Substance Use Topics    Alcohol use: Yes     Alcohol/week: 3.0 standard drinks of alcohol     Types: 3 Drinks containing 0.5 oz of alcohol per week     Comment: 6 Bottles of beer    Drug use: Never        Physical Exam      Vital Signs Reviewed   /68   Pulse 88   Temp 98.4 °F (36.9 °C)   Resp 18   Ht 5' 9" (1.753 m)   Wt 73.5 kg (162 lb)   SpO2 100%   BMI 23.92 kg/m²        Procedures    Procedures     Labs     Results for orders placed or performed in visit on 12/26/24   Comprehensive Metabolic Panel    Collection Time: " 12/26/24  6:44 AM   Result Value Ref Range    Sodium 139 136 - 145 mmol/L    Potassium 4.0 3.5 - 5.1 mmol/L    Chloride 104 98 - 107 mmol/L    CO2 28 22 - 29 mmol/L    Glucose 92 74 - 100 mg/dL    Blood Urea Nitrogen 18.5 8.9 - 20.6 mg/dL    Creatinine 1.09 0.72 - 1.25 mg/dL    Calcium 9.0 8.4 - 10.2 mg/dL    Protein Total 6.3 (L) 6.4 - 8.3 gm/dL    Albumin 4.0 3.5 - 5.0 g/dL    Globulin 2.3 (L) 2.4 - 3.5 gm/dL    Albumin/Globulin Ratio 1.7 1.1 - 2.0 ratio    Bilirubin Total 3.0 (H) <=1.5 mg/dL    ALP 71 40 - 150 unit/L    ALT 26 0 - 55 unit/L    AST 24 5 - 34 unit/L    eGFR >60 mL/min/1.73/m2    Anion Gap 7.0 mEq/L    BUN/Creatinine Ratio 17    Lipid Panel    Collection Time: 12/26/24  6:44 AM   Result Value Ref Range    Cholesterol Total 201 (H) <=200 mg/dL    HDL Cholesterol 45 35 - 60 mg/dL    Triglyceride 91 34 - 140 mg/dL    Cholesterol/HDL Ratio 4 0 - 5    Very Low Density Lipoprotein 18     LDL Cholesterol 138.00 50.00 - 140.00 mg/dL   PSA, Screening    Collection Time: 12/26/24  6:44 AM   Result Value Ref Range    Prostate Specific Antigen 0.47 <=4.00 ng/mL   Urinalysis, Reflex to Urine Culture    Collection Time: 12/26/24  6:44 AM    Specimen: Urine   Result Value Ref Range    Color, UA Yellow Yellow, Light-Yellow, Colorless, Straw, Dark-Yellow    Appearance, UA Clear Clear    Specific Gravity, UA 1.019 1.005 - 1.030    pH, UA 6.0 5.0 - 8.5    Protein, UA Negative Negative    Glucose, UA Normal Negative, Normal    Ketones, UA Negative Negative    Blood, UA Negative Negative    Bilirubin, UA Negative Negative    Urobilinogen, UA Normal 0.2, 1.0, Normal    Nitrites, UA Negative Negative    Leukocyte Esterase, UA Negative Negative    RBC, UA 0-5 None Seen, 0-2, 3-5, 0-5 /HPF    WBC, UA 0-5 None Seen, 0-2, 3-5, 0-5 /HPF    Bacteria, UA None Seen None Seen, Trace /HPF    Squamous Epithelial Cells, UA None Seen None Seen, Trace, Rare /HPF    Mucous, UA Trace (A) None Seen /LPF   TSH    Collection Time: 12/26/24   6:44 AM   Result Value Ref Range    TSH 1.303 0.350 - 4.940 uIU/mL   CBC with Differential    Collection Time: 12/26/24  6:44 AM   Result Value Ref Range    WBC 4.99 4.50 - 11.50 x10(3)/mcL    RBC 5.15 4.70 - 6.10 x10(6)/mcL    Hgb 16.4 14.0 - 18.0 g/dL    Hct 46.8 42.0 - 52.0 %    MCV 90.9 80.0 - 94.0 fL    MCH 31.8 (H) 27.0 - 31.0 pg    MCHC 35.0 33.0 - 36.0 g/dL    RDW 12.3 11.5 - 17.0 %    Platelet 183 130 - 400 x10(3)/mcL    MPV 9.3 7.4 - 10.4 fL    Neut % 53.7 %    Lymph % 31.3 %    Mono % 10.8 %    Eos % 3.2 %    Basophil % 0.8 %    Lymph # 1.56 0.6 - 4.6 x10(3)/mcL    Neut # 2.68 2.1 - 9.2 x10(3)/mcL    Mono # 0.54 0.1 - 1.3 x10(3)/mcL    Eos # 0.16 0 - 0.9 x10(3)/mcL    Baso # 0.04 <=0.2 x10(3)/mcL    Imm Gran # 0.01 0 - 0.04 x10(3)/mcL    Imm Grans % 0.2 %    NRBC% 0.0 %       Assessment:     1. Gastroenteritis        Plan:   Medications sent to pharmacy.  Take as needed for diarrhea  Hydrate with Pedialyte, Gatorade or powerade. When you decide to eat, keep your diet simple and bland. Bread, crackers, bananas, rice or broth for example. You can take immodium over the counter for non-bloody diarrhea. If there is blood in your diarrhea or vomit, seek medical attention immediately. If you develop abdominal pain or you have worsening of your abdominal pain, seek medical attention immediately in the emergency department.  As discussed, if your diarrhea last for more than 4 days and is still watery, obtain a sample using the supplies provided and drop off at the clinic per the nurse's instructions.      Gastroenteritis  -     FECAL LEUKOCYTES - LACTOFERRIN ON  STOOL; Future; Expected date: 02/04/2025  -     GIARDIA/CRYPTOSPORIDIUM ANTIGEN; Future; Expected date: 02/04/2025  -     Occult Blood, Stool Screening (1 -3); Future; Expected date: 02/04/2025  -     Clostridium Diff Toxin, A & B, EIA  -     Stool Culture    Other orders  -     hyoscyamine 0.125 mg Subl; Place 1 tablet (0.125 mg total) under the tongue  every 6 (six) hours as needed (belly cramps/diarrhea).  Dispense: 12 tablet; Refill: 0

## 2025-05-24 ENCOUNTER — OFFICE VISIT (OUTPATIENT)
Dept: URGENT CARE | Facility: CLINIC | Age: 50
End: 2025-05-24
Payer: COMMERCIAL

## 2025-05-24 VITALS
TEMPERATURE: 98 F | HEIGHT: 69 IN | WEIGHT: 165 LBS | OXYGEN SATURATION: 98 % | HEART RATE: 92 BPM | RESPIRATION RATE: 20 BRPM | SYSTOLIC BLOOD PRESSURE: 116 MMHG | DIASTOLIC BLOOD PRESSURE: 79 MMHG | BODY MASS INDEX: 24.44 KG/M2

## 2025-05-24 DIAGNOSIS — R20.2 PARESTHESIA: Primary | ICD-10-CM

## 2025-05-24 DIAGNOSIS — Z86.19 HISTORY OF CHICKEN POX: ICD-10-CM

## 2025-05-24 PROCEDURE — 99213 OFFICE O/P EST LOW 20 MIN: CPT | Mod: ,,, | Performed by: PHYSICIAN ASSISTANT

## 2025-05-24 RX ORDER — VALACYCLOVIR HYDROCHLORIDE 1 G/1
1000 TABLET, FILM COATED ORAL 3 TIMES DAILY
Qty: 30 TABLET | Refills: 0 | Status: SHIPPED | OUTPATIENT
Start: 2025-05-24 | End: 2025-06-03

## 2025-05-24 NOTE — PROGRESS NOTES
"Subjective:      Patient ID: Rei Long is a 50 y.o. male.    Vitals:  height is 5' 9" (1.753 m) and weight is 74.8 kg (165 lb). His tympanic temperature is 97.5 °F (36.4 °C). His blood pressure is 116/79 and his pulse is 92. His respiration is 20 and oxygen saturation is 98%.     Chief Complaint: Other Misc (Got a spot on left side of my chest and back where the skin has a slight burning sensation when rubbed. - Entered by patient) and Rash     Patient is a 50 y.o. male who presents to urgent care with complaints of burning sensation to the touch on back and chest x4 days, and small skin rash that he noticed this morning. Alleviating factors include N/A with no relief. Patient denies N/V/D, shortness of breath, fever, palpitations.  Patient denies any pain unless there is direct touch to these locations on the chest and back.  Patient states that he did ride his bike yesterday and this did not cause any increase in pain.      ROS   Objective:     Physical Exam   HENT:   Head: Normocephalic and atraumatic.   Nose: Nose normal.   Cardiovascular: Normal rate, regular rhythm and normal heart sounds.   Pulmonary/Chest: Effort normal and breath sounds normal.   Abdominal: Normal appearance.   Neurological: He is alert.   Psychiatric: His behavior is normal. Mood normal.   Very few very small erythematous lesions noted on the chest.  No defined vesicular rash noted.  He does have some mild tenderness when I gently palpate the surface of his skin on the left lower chest in left thoracic back area.    The patient mentioned concerns for possible onset of shingles.  Given the presentation this could be a prodrome before the vesicular rash appears.  We will start Valtrex in the patient will monitor symptoms closely at home.  Instructed to seek follow-up care as needed.         Previous History      Review of patient's allergies indicates:   Allergen Reactions    Brentuximab vedotin Anaphylaxis, Shortness Of Breath and " "Swelling     Other reaction(s): Difficulty breathing, difficulty breathing       Past Medical History:   Diagnosis Date    Personal history of colonic polyps      Current Outpatient Medications   Medication Instructions    aspirin (ECOTRIN) 81 mg, Oral, Daily    atorvastatin (LIPITOR) 20 mg, Oral, Daily    carvediloL (COREG) 25 mg, 2 times daily    clobetasoL (OLUX) 0.05 % Foam Apply topically twice daily.    famotidine (PEPCID) 20 mg, Daily    gabapentin (NEURONTIN) 100 mg, Oral, 3 times daily PRN    hyoscyamine 0.125 mg, Sublingual, Every 6 hours PRN    levothyroxine (SYNTHROID) 88 mcg, Oral, Daily    pantoprazole (PROTONIX) 40 mg, Oral, Daily    valACYclovir (VALTREX) 1,000 mg, Oral, 3 times daily     Past Surgical History:   Procedure Laterality Date    CHOLECYSTECTOMY  12/30/2012    COLONOSCOPY W/ BIOPSIES  02/16/2023    EYE SURGERY  4/2013    LASIK     Family History   Problem Relation Name Age of Onset    Diabetes Father Xiang Long     Heart disease Father Xiang Long         Lenore-Parkinson-White syndrome       Social History[1]     Physical Exam      Vital Signs Reviewed   /79   Pulse 92   Temp 97.5 °F (36.4 °C) (Tympanic)   Resp 20   Ht 5' 9" (1.753 m)   Wt 74.8 kg (165 lb)   SpO2 98%   BMI 24.37 kg/m²        Procedures    Procedures     Labs     Results for orders placed or performed in visit on 12/26/24   Comprehensive Metabolic Panel    Collection Time: 12/26/24  6:44 AM   Result Value Ref Range    Sodium 139 136 - 145 mmol/L    Potassium 4.0 3.5 - 5.1 mmol/L    Chloride 104 98 - 107 mmol/L    CO2 28 22 - 29 mmol/L    Glucose 92 74 - 100 mg/dL    Blood Urea Nitrogen 18.5 8.9 - 20.6 mg/dL    Creatinine 1.09 0.72 - 1.25 mg/dL    Calcium 9.0 8.4 - 10.2 mg/dL    Protein Total 6.3 (L) 6.4 - 8.3 gm/dL    Albumin 4.0 3.5 - 5.0 g/dL    Globulin 2.3 (L) 2.4 - 3.5 gm/dL    Albumin/Globulin Ratio 1.7 1.1 - 2.0 ratio    Bilirubin Total 3.0 (H) <=1.5 mg/dL    ALP 71 40 - 150 unit/L    ALT 26 0 - " 55 unit/L    AST 24 5 - 34 unit/L    eGFR >60 mL/min/1.73/m2    Anion Gap 7.0 mEq/L    BUN/Creatinine Ratio 17    Lipid Panel    Collection Time: 12/26/24  6:44 AM   Result Value Ref Range    Cholesterol Total 201 (H) <=200 mg/dL    HDL Cholesterol 45 35 - 60 mg/dL    Triglyceride 91 34 - 140 mg/dL    Cholesterol/HDL Ratio 4 0 - 5    Very Low Density Lipoprotein 18     LDL Cholesterol 138.00 50.00 - 140.00 mg/dL   PSA, Screening    Collection Time: 12/26/24  6:44 AM   Result Value Ref Range    Prostate Specific Antigen 0.47 <=4.00 ng/mL   Urinalysis, Reflex to Urine Culture    Collection Time: 12/26/24  6:44 AM    Specimen: Urine   Result Value Ref Range    Color, UA Yellow Yellow, Light-Yellow, Colorless, Straw, Dark-Yellow    Appearance, UA Clear Clear    Specific Gravity, UA 1.019 1.005 - 1.030    pH, UA 6.0 5.0 - 8.5    Protein, UA Negative Negative    Glucose, UA Normal Negative, Normal    Ketones, UA Negative Negative    Blood, UA Negative Negative    Bilirubin, UA Negative Negative    Urobilinogen, UA Normal 0.2, 1.0, Normal    Nitrites, UA Negative Negative    Leukocyte Esterase, UA Negative Negative    RBC, UA 0-5 None Seen, 0-2, 3-5, 0-5 /HPF    WBC, UA 0-5 None Seen, 0-2, 3-5, 0-5 /HPF    Bacteria, UA None Seen None Seen, Trace /HPF    Squamous Epithelial Cells, UA None Seen None Seen, Trace, Rare /HPF    Mucous, UA Trace (A) None Seen /LPF   TSH    Collection Time: 12/26/24  6:44 AM   Result Value Ref Range    TSH 1.303 0.350 - 4.940 uIU/mL   CBC with Differential    Collection Time: 12/26/24  6:44 AM   Result Value Ref Range    WBC 4.99 4.50 - 11.50 x10(3)/mcL    RBC 5.15 4.70 - 6.10 x10(6)/mcL    Hgb 16.4 14.0 - 18.0 g/dL    Hct 46.8 42.0 - 52.0 %    MCV 90.9 80.0 - 94.0 fL    MCH 31.8 (H) 27.0 - 31.0 pg    MCHC 35.0 33.0 - 36.0 g/dL    RDW 12.3 11.5 - 17.0 %    Platelet 183 130 - 400 x10(3)/mcL    MPV 9.3 7.4 - 10.4 fL    Neut % 53.7 %    Lymph % 31.3 %    Mono % 10.8 %    Eos % 3.2 %    Basophil %  0.8 %    Lymph # 1.56 0.6 - 4.6 x10(3)/mcL    Neut # 2.68 2.1 - 9.2 x10(3)/mcL    Mono # 0.54 0.1 - 1.3 x10(3)/mcL    Eos # 0.16 0 - 0.9 x10(3)/mcL    Baso # 0.04 <=0.2 x10(3)/mcL    Imm Gran # 0.01 0 - 0.04 x10(3)/mcL    Imm Grans % 0.2 %    NRBC% 0.0 %       Assessment:     1. Paresthesia    2. History of chicken pox        Plan:       Paresthesia    History of chicken pox    Other orders  -     valACYclovir (VALTREX) 1000 MG tablet; Take 1 tablet (1,000 mg total) by mouth 3 (three) times daily. for 10 days  Dispense: 30 tablet; Refill: 0        Tylenol  as needed.     Go to the ER with any significant change or worsening of symptoms.     Follow up with your primary care doctor.                        [1]   Social History  Tobacco Use    Smoking status: Never     Passive exposure: Never    Smokeless tobacco: Never   Substance Use Topics    Alcohol use: Yes     Alcohol/week: 3.0 standard drinks of alcohol     Types: 3 Drinks containing 0.5 oz of alcohol per week     Comment: 6 Bottles of beer    Drug use: Never

## 2025-05-24 NOTE — PATIENT INSTRUCTIONS
Tylenol  as needed.     Go to the ER with any significant change or worsening of symptoms.     Follow up with your primary care doctor.

## 2025-06-05 ENCOUNTER — OFFICE VISIT (OUTPATIENT)
Dept: URGENT CARE | Facility: CLINIC | Age: 50
End: 2025-06-05
Payer: COMMERCIAL

## 2025-06-05 ENCOUNTER — RESULTS FOLLOW-UP (OUTPATIENT)
Dept: URGENT CARE | Facility: CLINIC | Age: 50
End: 2025-06-05

## 2025-06-05 VITALS
HEART RATE: 88 BPM | DIASTOLIC BLOOD PRESSURE: 89 MMHG | HEIGHT: 69 IN | BODY MASS INDEX: 24.44 KG/M2 | OXYGEN SATURATION: 100 % | TEMPERATURE: 98 F | WEIGHT: 165 LBS | RESPIRATION RATE: 18 BRPM | SYSTOLIC BLOOD PRESSURE: 132 MMHG

## 2025-06-05 DIAGNOSIS — R19.7 DIARRHEA, UNSPECIFIED TYPE: Primary | ICD-10-CM

## 2025-06-05 DIAGNOSIS — K59.00 CONSTIPATION, UNSPECIFIED CONSTIPATION TYPE: ICD-10-CM

## 2025-08-19 DIAGNOSIS — Z00.00 WELL ADULT EXAM: ICD-10-CM

## 2025-08-19 RX ORDER — LEVOTHYROXINE SODIUM 88 UG/1
88 TABLET ORAL DAILY
Qty: 90 TABLET | Refills: 3 | Status: SHIPPED | OUTPATIENT
Start: 2025-08-19 | End: 2026-08-19

## 2025-08-19 RX ORDER — PANTOPRAZOLE SODIUM 40 MG/1
40 TABLET, DELAYED RELEASE ORAL DAILY
Qty: 90 TABLET | Refills: 3 | Status: SHIPPED | OUTPATIENT
Start: 2025-08-19 | End: 2026-08-19